# Patient Record
Sex: FEMALE | Race: WHITE | HISPANIC OR LATINO | Employment: FULL TIME | URBAN - METROPOLITAN AREA
[De-identification: names, ages, dates, MRNs, and addresses within clinical notes are randomized per-mention and may not be internally consistent; named-entity substitution may affect disease eponyms.]

---

## 2023-03-21 ENCOUNTER — OFFICE VISIT (OUTPATIENT)
Dept: MEDICAL GROUP | Facility: LAB | Age: 38
End: 2023-03-21
Payer: COMMERCIAL

## 2023-03-21 VITALS
BODY MASS INDEX: 39.93 KG/M2 | RESPIRATION RATE: 16 BRPM | DIASTOLIC BLOOD PRESSURE: 90 MMHG | WEIGHT: 217 LBS | HEART RATE: 76 BPM | HEIGHT: 62 IN | TEMPERATURE: 97.3 F | OXYGEN SATURATION: 97 % | SYSTOLIC BLOOD PRESSURE: 164 MMHG

## 2023-03-21 DIAGNOSIS — Z79.4 OTHER SPECIFIED DIABETES MELLITUS WITH OTHER SPECIFIED COMPLICATION, WITH LONG-TERM CURRENT USE OF INSULIN (HCC): ICD-10-CM

## 2023-03-21 DIAGNOSIS — K59.09 OTHER CONSTIPATION: ICD-10-CM

## 2023-03-21 DIAGNOSIS — E13.69 OTHER SPECIFIED DIABETES MELLITUS WITH OTHER SPECIFIED COMPLICATION, WITH LONG-TERM CURRENT USE OF INSULIN (HCC): ICD-10-CM

## 2023-03-21 DIAGNOSIS — E89.0 S/P THYROIDECTOMY: ICD-10-CM

## 2023-03-21 DIAGNOSIS — I15.9 SECONDARY HYPERTENSION: ICD-10-CM

## 2023-03-21 DIAGNOSIS — Z76.89 ENCOUNTER TO ESTABLISH CARE WITH NEW DOCTOR: ICD-10-CM

## 2023-03-21 DIAGNOSIS — R10.84 GENERALIZED ABDOMINAL PAIN: ICD-10-CM

## 2023-03-21 PROBLEM — I10 HYPERTENSIVE DISORDER: Status: ACTIVE | Noted: 2021-04-26

## 2023-03-21 PROBLEM — Z90.89 S/P THYROIDECTOMY: Status: ACTIVE | Noted: 2023-03-21

## 2023-03-21 PROBLEM — E11.9 DIABETES (HCC): Status: ACTIVE | Noted: 2021-04-26

## 2023-03-21 PROBLEM — Z98.890 S/P THYROIDECTOMY: Status: ACTIVE | Noted: 2023-03-21

## 2023-03-21 LAB
HBA1C MFR BLD: 8.2 % (ref ?–5.8)
POCT INT CON NEG: NEGATIVE
POCT INT CON POS: POSITIVE

## 2023-03-21 PROCEDURE — 83036 HEMOGLOBIN GLYCOSYLATED A1C: CPT | Performed by: FAMILY MEDICINE

## 2023-03-21 PROCEDURE — 99204 OFFICE O/P NEW MOD 45 MIN: CPT | Performed by: FAMILY MEDICINE

## 2023-03-21 RX ORDER — VALSARTAN AND HYDROCHLOROTHIAZIDE 160; 25 MG/1; MG/1
1 TABLET ORAL DAILY
COMMUNITY
End: 2023-03-21

## 2023-03-21 RX ORDER — DOCUSATE SODIUM 100 MG/1
100 CAPSULE, LIQUID FILLED ORAL 2 TIMES DAILY
Qty: 60 CAPSULE | Refills: 6 | Status: SHIPPED | OUTPATIENT
Start: 2023-03-21 | End: 2023-09-11

## 2023-03-21 RX ORDER — LOSARTAN POTASSIUM 50 MG/1
50 TABLET ORAL DAILY
Qty: 90 TABLET | Refills: 3 | Status: SHIPPED | OUTPATIENT
Start: 2023-03-21 | End: 2023-04-25

## 2023-03-21 RX ORDER — INSULIN LISPRO 100 [IU]/ML
INJECTION, SOLUTION INTRAVENOUS; SUBCUTANEOUS
COMMUNITY
End: 2023-03-27

## 2023-03-21 RX ORDER — INSULIN LISPRO 100 [IU]/ML
30-60 INJECTION, SOLUTION INTRAVENOUS; SUBCUTANEOUS
COMMUNITY

## 2023-03-22 NOTE — PROGRESS NOTES
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  Discussed the following today:    1. Other specified diabetes mellitus   Chronic ongoing problem, diagnosed in her 20s.  Patient said she was told she is type 1 insulin-dependent, taking large amount of insulin including long acting intermediate acting insulin 50 units twice a day and she takes up to 30 units of the regular insulin at the time before lunch sometimes.  Denies hypoglycemia episodes, used to have an endocrinologist to follow-up with in [place].  No records available yet.  A1c rechecked at the office today 8.2    2. Encounter to establish care with new doctor  As part of establishing care visit, reviewed patient past medical problems, past surgical surgical history, family/social history and medications today, patient works in [employer], lives with her partner.  Moved recently from [place]    3. Other constipation  Ongoing problem for the past few months, reports abdominal discomfort especially on the left flank area and the left side of her abdomen.  Patient has been trying to drink large amount of water and increase fiber in diet, sometimes she feels that her hemorrhoids are acting up because of the constipation    4. S/P thyroidectomy  Last year had a thyroidectomy in [place] related to thyroid mass, she was told it was benign.  Partial thyroidectomy was done.  Not on thyroid medications at this time.  No labs available or records from previous providers    5. Secondary hypertension  History of hypertension, blood pressure at the office today 164/90, patient used to be on valsartan hydrochlorothiazide combination medication stop the medication for the past 2 weeks because she did not have available medication.  And  requesting to change medication because hydrochlorothiazide causes her a lot of dry mouth.  Denies headache today or chest pain or shortness of breath    6. Generalized abdominal pain  Generalized nonspecific abdominal pain with some radiation on the left flank radiating to the lower abdominal area, this has been going on for more than 3 months now.  Reports constipation as described above.  Denies nausea vomiting or diarrhea denies fever.  Patient Active Problem List    Diagnosis Date Noted    Encounter to establish care with new doctor 03/21/2023    Other constipation 03/21/2023    S/P thyroidectomy 03/21/2023    Diabetes (HCC) 04/26/2021    Hypertensive disorder 04/26/2021       Current Outpatient Medications   Medication Sig Dispense Refill    insulin lispro 100 UNIT/ML INJ Inject  under the skin.      insulin lispro (HUMALOG KWIKPEN) 100 UNIT/ML SC SOPN injection PEN Inject  under the skin 3 times a day before meals.      docusate sodium (COLACE) 100 MG Cap Take 1 Capsule by mouth 2 times a day. 60 Capsule 6    losartan (COZAAR) 50 MG Tab Take 1 Tablet by mouth every day. 90 Tablet 3     No current facility-administered medications for this visit.         Allergies as of 03/21/2023 - never reviewed   Allergen Reaction Noted    Penicillins  03/21/2023        ROS: Denies any chest pain, Shortness of breath, Changes bowel or bladder, Lower extremity edema.    Physical Exam:  Gen.: Well-developed, well-nourished, no apparent distress,pleasant and cooperative with the examination  Skin:  Warm and dry with good turgor. No rashes or suspicious lesions in visible areas  Eye: PERRLA, conjunctiva and sclera clear, lids normal  HEENT: Normocephalic/atraumatic, sinuses nontender with palpation, TMs clear, nares patent with pink mucosa and clear rhinorrhea, lips without lesions, oropharynx clear.  Neck: Trachea midline,no masses or adenopathy  Thyroid: normal consistency and size. No masses or nodules. Not tender with  palpation.  Cor: Regular rate and rhythm without murmur, gallop or rub.  Lungs: Respirations unlabored.Clear to auscultation with equal breath sounds bilaterally. No wheezes, rhonchi.  Abdomen: Soft nontender without hepatosplenomegaly or masses appreciated, normoactive bowel sounds. No hernias.  Extremities: No cyanosis, clubbing or edema, Symmetrical without deformities or malformations. Pulses 2+ and symmetrical both upper and lower extremities  Lymphatic: No abnormal adenopathy of the neck groin or axillae.  Psych: Alert and oriented x 3.Normal affect, judgement,insight and memory.    Monofilament testing with a 10 gram force: sensation intact: intact bilaterally  Visual Inspection: Feet without maceration, ulcers, fissures.  Pedal pulses: intact bilaterally     Assessment and Plan.   37 y.o. female here to establish care    1. Other specified diabetes mellitus with other specified complication, with long-term current use of insulin (HCC)  Chronic ongoing uncontrolled.  A1c 8.2.  Discussed with the patient possibility of starting her on GLP-1 agonist, continue insulin for now referred to see endocrinology, patient to do labs before further assessment  - POCT  A1C  - Referral to Endocrinology  - Lipid Profile; Future  - Comp Metabolic Panel; Future  - URINALYSIS,CULTURE IF INDICATED; Future  - Microalbumin Creat Ratio Urine - Clinic Collect    2. Encounter to establish care with new doctor  Reviewed medical history as above, due for Pap, advised to schedule an appointment for cervical cancer screening, Tdap in the past 5 years as per patient history no records available    3. Other constipation  Chronic ongoing problem, discussed importance of hydration increase fiber in diet, will start patient on stool softener patient to take it regularly if not improved will consider GI consultation  - docusate sodium (COLACE) 100 MG Cap; Take 1 Capsule by mouth 2 times a day.  Dispense: 60 Capsule; Refill: 6  - DX-ABDOMEN  COMPLETE WITH AP OR PA CXR; Future    4. S/P thyroidectomy  Chronic, recheck thyroid function follow-up with endocrinology  - Referral to Endocrinology  - TSH; Future  - FREE THYROXINE; Future  - TRIIDOTHYRONINE; Future    5. Secondary hypertension  Chronic, not controlled.  Restart patient on losartan, follow-up in 2 weeks to recheck blood pressure.  Asymptomatic  - losartan (COZAAR) 50 MG Tab; Take 1 Tablet by mouth every day.  Dispense: 90 Tablet; Refill: 3  - CBC WITH DIFFERENTIAL; Future    6. Generalized abdominal pain  Chronic ongoing, possibly related to constipation, will rule out other causes, advised to do an ultrasound for further assessment.  No red flags at this time, benign clinical exam.  Advised if any increase on her pain or worsening of symptoms to report to emergency room.  - US-ABDOMEN COMPLETE SURVEY; Future  Please note that this dictation was created using voice recognition software. I have worked with consultants from the vendor as well as technical experts from [employer] to optimize the interface. I have made every reasonable attempt to correct obvious errors, but I expect that there are errors of grammar and possibly content that I did not discover before finalizing the note.

## 2023-03-27 ENCOUNTER — OFFICE VISIT (OUTPATIENT)
Dept: ENDOCRINOLOGY | Facility: MEDICAL CENTER | Age: 38
End: 2023-03-27
Payer: COMMERCIAL

## 2023-03-27 ENCOUNTER — APPOINTMENT (OUTPATIENT)
Dept: ENDOCRINOLOGY | Facility: MEDICAL CENTER | Age: 38
End: 2023-03-27
Payer: COMMERCIAL

## 2023-03-27 VITALS
HEART RATE: 80 BPM | BODY MASS INDEX: 41.22 KG/M2 | WEIGHT: 224 LBS | HEIGHT: 62 IN | SYSTOLIC BLOOD PRESSURE: 164 MMHG | OXYGEN SATURATION: 98 % | DIASTOLIC BLOOD PRESSURE: 90 MMHG

## 2023-03-27 DIAGNOSIS — I10 HYPERTENSION, UNSPECIFIED TYPE: ICD-10-CM

## 2023-03-27 DIAGNOSIS — B37.9 YEAST INFECTION: ICD-10-CM

## 2023-03-27 DIAGNOSIS — Z13.21 ENCOUNTER FOR VITAMIN DEFICIENCY SCREENING: ICD-10-CM

## 2023-03-27 DIAGNOSIS — E10.9 TYPE 1 DIABETES MELLITUS WITHOUT COMPLICATION (HCC): ICD-10-CM

## 2023-03-27 DIAGNOSIS — E89.0 S/P PARTIAL THYROIDECTOMY: ICD-10-CM

## 2023-03-27 PROCEDURE — 99205 OFFICE O/P NEW HI 60 MIN: CPT

## 2023-03-27 PROCEDURE — 99213 OFFICE O/P EST LOW 20 MIN: CPT

## 2023-03-27 RX ORDER — METFORMIN HYDROCHLORIDE 500 MG/1
500 TABLET, EXTENDED RELEASE ORAL 2 TIMES DAILY
Qty: 180 TABLET | Refills: 4 | Status: SHIPPED | OUTPATIENT
Start: 2023-03-27 | End: 2023-07-14

## 2023-03-27 RX ORDER — FLUCONAZOLE 150 MG/1
150 TABLET ORAL DAILY
Qty: 3 TABLET | Refills: 0 | Status: SHIPPED | OUTPATIENT
Start: 2023-03-27 | End: 2023-03-30

## 2023-03-27 RX ORDER — SEMAGLUTIDE 0.68 MG/ML
0.5 INJECTION, SOLUTION SUBCUTANEOUS
Qty: 9 ML | Refills: 3 | Status: SHIPPED | OUTPATIENT
Start: 2023-03-27 | End: 2023-09-28

## 2023-03-27 RX ORDER — INSULIN HUMAN 100 [IU]/ML
60 INJECTION, SUSPENSION SUBCUTANEOUS
COMMUNITY
End: 2023-09-11 | Stop reason: SDUPTHER

## 2023-03-27 NOTE — PROGRESS NOTES
RN-CDE Note    Subjective:   Endocrinology Clinic Progress Note  PCP: Bijan Chi M.D.    HPI:  Rita Norris is a 37 y.o. old patient who is seen today by the Diabetes Nurse Specialist for review of Diabetes.  Unsure if she is Type 1 , 1.5, or Type 2.  Recent changes in health: Gaining weight and having yeast infection.  DM:   Last A1c:   Lab Results   Component Value Date/Time    HBA1C 8.2 (A) 03/21/2023 05:02 PM        A1C GOAL: < 7    Diabetes Medications:   70/30 60 units BID  Humalog 30 units at lunch and Humalog 60 units at dinner      Exercise: Walking at work  Diet: Breakfast is breakfast sandwich and coffee.  1 pm is lunch is tacos, sandwich, or salad.  Dinner is protein, pasta, and juicing vegetables.  Patient's body mass index is unknown because there is no height or weight on file. Exercise and nutrition counseling were performed at this visit.    Glucose monitoring frequency: Dexcom .  She will share with us so that we can download her CGM    Hypoglycemic episodes: yes - During the night if takes too much insulin.  Last Retinal Exam:  Needs  Daily Foot Exam: Yes   Foot Exam:  Monofilament: done  Monofilament testing with a 10 gram force: sensation intact: intact bilaterally  Visual Inspection: Feet without maceration, ulcers, fissures.  Pedal pulses: intact bilaterally   No results found for: MICROALBCALC, MALBCRT, MALBEXCR, [id number], MICROALBUR, MICRALB, UMICROALBUM, MICROALBTIM     ACR Albumin/Creatinine Ratio goal <30     HTN:   Blood pressure goal <130/<80 .   Currently Rx ACE/ARB: Yes     Dyslipidemia:    No results found for: CHOLSTRLTOT, LDL, HDL, TRIGLYCERIDE      Currently Rx Statin: No     She  reports that she has been smoking cigarettes. She has been smoking an average of .5 packs per day. She has never used smokeless tobacco.      Plan:     Discussed and educated on:   - All medications, side effects and compliance (discussed carefully)  - Annual eye examinations at Ophthalmology  -  Home glucose monitoring emphasized  - Weight control and daily exercise    Recommended medication changes: She will get her lab work done.  Start on Ozempic and back on Meformin  mg BID.    Ozempic .25-.5 mg pen x1 sampled ( [id number] expir. 7/31).

## 2023-03-27 NOTE — PROGRESS NOTES
Chief Complaint:  Consult requested by Bijan Chi M.D. for initial evaluation of Type 1 Diabetes Mellitus    HPI:   Rita Norris is a 37 y.o. female with Type 1 Diabetes Mellitus diagnosed at the age of 22.  She denies hospitalizations for DKA in the past. She recently moved from [place] about a month ago.     Diabetes Type 1    Glycohemoglobin A1c on 3/21/23 was 8.2    Current Medications  70/30 60 units in am and before dinner  Humalog 30 units at lunch 30-60 units at dinner    She was previously on metformin and glipizide pre pregnancy 9 years ago, during pregnancy she was placed on insulin and stayed post pregnancy.     She monitors blood glucose via dexcom. See Download  Blood glucose values range:Fastin-120            She denies hypoglycemic episodes   She  denies hypoglycemic unawareness.   She denies episodes of severe hypoglycemia requiring third party assistance.    She  is not wearing a medical alert bracelet or necklace.    She does not a glucagon emergency kit.    She reports attending diabetes education classes.  Diet: eating better since recent move adopted mediterranean diet, prior eating more proteins and limiting carbs.  Activity: minimal    Diabetes Complications   She  denies history of retinopathy.    She denies laser eye surgery.   Last eye exam: a year ago    She denies history of peripheral sensory neuropathy.    She reports numbness in fingertips- occasionally  She denies history of foot sores.     She denies history of kidney damage.    She is on ACE inhibitor or ARB.  Currently taking losartan 50 mg daily  BP: 164/90    She denies history of coronary artery disease.    She  denies history of stroke and denies TIA.    She denies history of PAD.  She denies history of hyperlipidemia.    2. Hypertension  Currently taking losartan 50mg daily  She just started taking the losartarn today being the first dose. She states the blood pressure has been higher before.  BP: 164/90    3.  S/P Partial thyroidectomy  Partial right thyroidectomy last year in [place] related to thyroid mass which she was told was benign. Currently not on medication.   Patient states she feels like something is stuck in her throat.   Denies: SOB, anterior neck pain.   Reports: weight gain, mind fogginess, anxiety  Denies: fatigue, tremors, palpitations  No current thyroid labs available    4. Screening for vitamin D deficiency    5. Yeast infection  Started 2 days ago  Reports: itching, white discharge, raw in perineal area and painful    ROS:     CONS:     No fever, no chills, no weight loss, no fatigue   EYES:      No diplopia, no blurry vision, no redness of eyes, no swelling of eyelids   ENT:    No hearing loss, No ear pain, No sore throat, no dysphagia, no neck swelling   CV:     No chest pain, no palpitations, no claudication, no orthopnea, no PND   PULM:    No SOB, no cough, no hemoptysis, no wheezing    GI:   No nausea, no vomiting, no diarrhea, no constipation, no bloody stools   :  Passing urine well, no dysuria, no hematuria   ENDO:   No polyuria, no polydipsia, no heat intolerance, no cold intolerance   NEURO: No headaches, no dizziness, no convulsions, no tremors   MUSC:  No joint swellings, no arthralgias, no myalgias, no weakness   SKIN:   No rash, no ulcers, no dry skin   PSYCH:   No depression, no anxiety, no difficulty sleeping       Past Medical History:  Patient Active Problem List    Diagnosis Date Noted    Encounter to establish care with new doctor 03/21/2023    Other constipation 03/21/2023    S/P thyroidectomy 03/21/2023    Diabetes (HCC) 04/26/2021    Hypertensive disorder 04/26/2021       Past Surgical History:  Past Surgical History:   Procedure Laterality Date    PRIMARY C SECTION          Allergies:  Penicillins     Current Medications:    Current Outpatient Medications:     Insulin NPH Isophane & Regular (HUMULIN 70/30 KWIKPEN) (70-30) 100 UNIT/ML Suspension Pen-injector, Inject 60 Units  under the skin 2 times daily, before breakfast and dinner., Disp: , Rfl:     Multiple Vitamins-Minerals (QC WOMENS DAILY MULTIVITAMIN PO), Take  by mouth., Disp: , Rfl:     fluconazole (DIFLUCAN) 150 MG tablet, Take 1 Tablet by mouth every day for 3 days., Disp: 3 Tablet, Rfl: 0    Semaglutide,0.25 or 0.5MG/DOS, (OZEMPIC, 0.25 OR 0.5 MG/DOSE,) 2 MG/3ML Solution Pen-injector, Inject 0.5 mg under the skin every 7 days., Disp: 9 mL, Rfl: 3    metFORMIN ER (GLUCOPHAGE XR) 500 MG TABLET SR 24 HR, Take 1 Tablet by mouth 2 times a day., Disp: 180 Tablet, Rfl: 4    insulin lispro 100 UNIT/ML SC SOPN injection PEN, Inject 30-60 Units under the skin 3 times a day before meals., Disp: , Rfl:     docusate sodium (COLACE) 100 MG Cap, Take 1 Capsule by mouth 2 times a day., Disp: 60 Capsule, Rfl: 6    losartan (COZAAR) 50 MG Tab, Take 1 Tablet by mouth every day., Disp: 90 Tablet, Rfl: 3    Social History:  Social History     Socioeconomic History    Marital status: Single     Spouse name: Not on file    Number of children: Not on file    Years of education: Not on file    Highest education level: Not on file   Occupational History     Comment: manifacturing   Tobacco Use    Smoking status: Some Days     Packs/day: 0.50     Types: Cigarettes    Smokeless tobacco: Never   Vaping Use    Vaping Use: Not on file   Substance and Sexual Activity    Alcohol use: Yes     Alcohol/week: 0.6 oz     Types: 1 Shots of liquor per week    Drug use: Not Currently     Types: Marijuana    Sexual activity: Yes     Partners: Male   Other Topics Concern    Not on file   Social History Narrative    Not on file     Social Determinants of Health     Financial Resource Strain: Not on file   Food Insecurity: Not on file   Transportation Needs: Not on file   Physical Activity: Not on file   Stress: Not on file   Social Connections: Not on file   Intimate Partner Violence: Not on file   Housing Stability: Not on file        Family History:   Family History  "  Problem Relation Age of Onset    Psychiatric Illness Mother         depression    Cancer Mother         thyroid ca    Hypertension Father     Alcohol abuse Father     Diabetes Paternal Grandmother     Heart Disease Paternal Grandfather          PHYSICAL EXAM:   Vital signs: BP (!) 164/90   Pulse 80   Ht 1.575 m (5' 2\")   Wt 102 kg (224 lb)   SpO2 98%   BMI 40.97 kg/m²   GENERAL: Well-developed, well-nourished  in no apparent distress.   EYE: No ocular and eyelid asymmetry, Anicteric sclerae,  PERRL, No exophthalmos or lidlag  HENT: Hearing grossly intact, Normocephalic, atraumatic. Pink, moist mucous membranes, No exudate  NECK: Supple. Trachea midline.   CARDIOVASCULAR: Regular rate and rhythm. No murmurs, rubs, or gallops.   LUNGS: Clear to auscultation bilaterally   ABDOMEN: Soft, nontender with positive bowel sounds.   EXTREMITIES: No clubbing, cyanosis, or edema.   NEUROLOGICAL: Cranial nerves II-XII are grossly intact   Symmetric reflexes at the patella no proximal muscle weakness, No visible tremor with both outstretched hands  LYMPH: No cervical, supraclavicular,  adenopathy palpated.   SKIN: No rashes, lesions. Turgor is normal.  FOOT: Normal sensation to monofilament testing, normal pulses, no ulcers.  Normal Vibration quantitative sensation test.    Labs:  Lab Results   Component Value Date/Time    HBA1C 8.2 (A) 03/21/2023 1702       No results found for: WBC, RBC, HEMOGLOBIN, MCV, MCH, MCHC, RDW, MPV    No results found for: SODIUM, POTASSIUM, CHLORIDE, CO2, ANION, GLUCOSE, BUN, CREATININE, CALCIUM, ASTSGOT, ALTSGPT, TBILIRUBIN, ALBUMIN, TOTPROTEIN, GLOBULIN, AGRATIO    No results found for: CHOLSTRLTOT, TRIGLYCERIDE, HDL, LDL, CHOLHDLRAT, NONHDL    No results found for: MICROALBCALC, MALBCRT, MALBEXCR, [id number], MICROALBUR, MICRALB, UMICROALBUM, MICROALBTIM     No results found for: TSHULTRASEN  No results found for: FREEDIR  No results found for: FREET3  No results found for: " "THYSTIMIG        ASSESSMENT/PLAN:     1. Type 1 diabetes mellitus without complication (HCC)  Unstable  Medications:   Ozempic 0.25mg every 7 days - Start  Metformin  mg BID - Start  70/30 - 60 units 2 times daily before meals - Continue  Humalog - 30 units before lunch - Continue  Sample of Ozempic given to patient. Patient understands how to use pen and demonstrated back. Discussed side effects with patient.   Patient understands to adjust insulin according to blood sugars prior to meals.  Continue with diet plan  Recommend exercise 30 mins daily  Recommend check her feet daily  - Comp Metabolic Panel; Future  - CBC WITH DIFFERENTIAL; Future  - C-PEPTIDE; Future  - MELITON-65; Future  - Lipid Profile; Future  - MICROALBUMIN CREAT RATIO URINE; Future  - IA-2 AUTOANTIBODIES  - Semaglutide,0.25 or 0.5MG/DOS, (OZEMPIC, 0.25 OR 0.5 MG/DOSE,) 2 MG/3ML Solution Pen-injector; Inject 0.5 mg under the skin every 7 days.  Dispense: 9 mL; Refill: 3  - metFORMIN ER (GLUCOPHAGE XR) 500 MG TABLET SR 24 HR; Take 1 Tablet by mouth 2 times a day.  Dispense: 180 Tablet; Refill: 4    2. S/P partial thyroidectomy  Unstable  I will get thyroid panel for further evaluation and treatment  I will get US of thyroid as patient is c/o feelings of \"something stuck in her throat\"  - FREE THYROXINE; Future  - TSH; Future  - US-THYROID; Future    3. Hypertension, unspecified type  Unstable  Patient started losartan today  Continue current regimen    4. Encounter for vitamin deficiency screening  Discussed symptoms of vitamin D deficiency including, fatigue, mind fogginess, and anxiety  I will get Vitamin D levels for further evaluation  - VITAMIN D,25 HYDROXY (DEFICIENCY); Future    5. Yeast infection  Unstable  Recommend Diflucan 150 mg daily x 3 days  Recommend patient to stay hydrated  - fluconazole (DIFLUCAN) 150 MG tablet; Take 1 Tablet by mouth every day for 3 days.  Dispense: 3 Tablet; Refill: 0     Return in about 4 weeks (around " 4/24/2023). Patient will have blood work completed 1 week prior to next apt in 4 weeks.   65 mins was spent on this visit discussing treatment plan, demonstration of medication pen, discussing diet, and answering questions.     This patient during there office visit was started on new medication.  Side effects of new medications were discussed with the patient today in the office. The patient was supplied paperwork on this new medication.    Thank you kindly for allowing me to participate in the diabetes care plan for this patient.    Elio Fonseca, ANAIS   03/27/23    CC:   Bijan Chi M.D.

## 2023-03-27 NOTE — PROGRESS NOTES
Chief Complaint:  Consult requested by Bijan Chi M.D. for initial evaluation of Type 1 Diabetes Mellitus    HPI:   Rita Norris is a 37 y.o. female with Type 1 Diabetes Mellitus diagnosed at the age of 22.  She {Actions; denies-reports:025817} hospitalizations for DKA in the past.    Diabetes Type 1    Glycohemoglobin A1c on 3/21/23 was 8.2    Current Medications  Humalog TID before meal times      She monitors blood glucose  {NUMBERS 1-12:24399} times per {Time; day/week/month:34640}. Blood glucose values range:{GLUCOSE TESTIN}           She {DENIES:96477} hypoglycemic episodes occurring {NUMBERS 1-10:81618} times per {Time; day/week/month:17608} and are {MILD:56979}    She  {Actions; denies-reports:042830} hypoglycemic unawareness.   She {Actions; denies-reports:649796} episodes of severe hypoglycemia requiring third party assistance.    She  {IS/IS NOT:9024} wearing a medical alert bracelet or necklace.    She {DOES/DOES NOT:11791} a glucagon emergency kit.    She {Actions; denies-reports:363704} attending diabetes education classes.  Diet: {Desc; diets:16647}.    Diabetes Complications   She  {Actions; denies-reports:050438} history of retinopathy.    She {Actions; denies-reports:645640} laser eye surgery.   Last eye exam: {Time; month:86628}  {YEAR:69240}.    She {Actions; denies-reports:894913} history of peripheral sensory neuropathy.    She {Actions; denies-reports:977505} {Numbness:20644} in {FOOT/FEET:45023}.    She {Actions; denies-reports:349837} history of foot sores.     She {Actions; denies-reports:484622} history of kidney damage.    She is on ACE inhibitor or ARB.   Currently taking losartan 50 mg daily    She {Actions; denies-reports:242765} history of coronary artery disease.    She  {Actions; denies-reports:178869} history of stroke and {Actions; denies-reports:141670} TIA.  She {Actions; denies-reports:299761} history of PAD.  She {Actions; denies-reports:421805} history of  hyperlipidemia.    2. Hypertension  Currently taking losartan 50mg daily  BP:     3. S/P Partial thyroidectomy  Partial *** Thyroidectomy last year in [place] related to thyroid mass which she was told was benign. Currently not on medication.   Denies/Reports: weight gain, fatigue, mind fogginess, anxiety, tremors, palpitations  No current thyroid labs available    4. Screening for Vitamin D deficiency        ROS:     CONS:     No fever, no chills, no weight loss, no fatigue   EYES:      No diplopia, no blurry vision, no redness of eyes, no swelling of eyelids   ENT:    No hearing loss, No ear pain, No sore throat, no dysphagia, no neck swelling   CV:     No chest pain, no palpitations, no claudication, no orthopnea, no PND   PULM:    No SOB, no cough, no hemoptysis, no wheezing    GI:   No nausea, no vomiting, no diarrhea, no constipation, no bloody stools   :  Passing urine well, no dysuria, no hematuria   ENDO:   No polyuria, no polydipsia, no heat intolerance, no cold intolerance   NEURO: No headaches, no dizziness, no convulsions, no tremors   MUSC:  No joint swellings, no arthralgias, no myalgias, no weakness   SKIN:   No rash, no ulcers, no dry skin   PSYCH:   No depression, no anxiety, no difficulty sleeping       Past Medical History:  Patient Active Problem List    Diagnosis Date Noted    Encounter to establish care with new doctor 03/21/2023    Other constipation 03/21/2023    S/P thyroidectomy 03/21/2023    Diabetes (HCC) 04/26/2021    Hypertensive disorder 04/26/2021       Past Surgical History:  Past Surgical History:   Procedure Laterality Date    PRIMARY C SECTION          Allergies:  Penicillins     Current Medications:    Current Outpatient Medications:     insulin lispro 100 UNIT/ML INJ, Inject  under the skin., Disp: , Rfl:     insulin lispro (HUMALOG KWIKPEN) 100 UNIT/ML SC SOPN injection PEN, Inject  under the skin 3 times a day before meals., Disp: , Rfl:     docusate sodium (COLACE) 100 MG  Cap, Take 1 Capsule by mouth 2 times a day., Disp: 60 Capsule, Rfl: 6    losartan (COZAAR) 50 MG Tab, Take 1 Tablet by mouth every day., Disp: 90 Tablet, Rfl: 3    Social History:  Social History     Socioeconomic History    Marital status: Single     Spouse name: Not on file    Number of children: Not on file    Years of education: Not on file    Highest education level: Not on file   Occupational History     Comment: manifacturing   Tobacco Use    Smoking status: Some Days     Packs/day: 0.50     Types: Cigarettes    Smokeless tobacco: Never   Vaping Use    Vaping Use: Not on file   Substance and Sexual Activity    Alcohol use: Yes     Alcohol/week: 0.6 oz     Types: 1 Shots of liquor per week    Drug use: Not Currently     Types: Marijuana    Sexual activity: Yes     Partners: Male   Other Topics Concern    Not on file   Social History Narrative    Not on file     Social Determinants of Health     Financial Resource Strain: Not on file   Food Insecurity: Not on file   Transportation Needs: Not on file   Physical Activity: Not on file   Stress: Not on file   Social Connections: Not on file   Intimate Partner Violence: Not on file   Housing Stability: Not on file        Family History:   Family History   Problem Relation Age of Onset    Psychiatric Illness Mother         depression    Cancer Mother         thyroid ca    Hypertension Father     Alcohol abuse Father     Diabetes Paternal Grandmother     Heart Disease Paternal Grandfather          PHYSICAL EXAM:   Vital signs: There were no vitals taken for this visit.  GENERAL: Well-developed, well-nourished  in no apparent distress.   EYE: No ocular and eyelid asymmetry, Anicteric sclerae,  PERRL, No exophthalmos or lidlag  HENT: Hearing grossly intact, Normocephalic, atraumatic. Pink, moist mucous membranes, No exudate  NECK: Supple. Trachea midline. {thyroid:5702}  CARDIOVASCULAR: Regular rate and rhythm. No murmurs, rubs, or gallops.   LUNGS: Clear to  auscultation bilaterally   ABDOMEN: Soft, nontender with positive bowel sounds.   EXTREMITIES: No clubbing, cyanosis, or edema.   NEUROLOGICAL: Cranial nerves II-XII are grossly intact   Symmetric reflexes at the patella no proximal muscle weakness, No visible tremor with both outstretched hands  LYMPH: No cervical, supraclavicular,  adenopathy palpated.   SKIN: No rashes, lesions. Turgor is normal.  FOOT: Normal sensation to monofilament testing, normal pulses, no ulcers.  Normal Vibration quantitative sensation test.    Labs:  Lab Results   Component Value Date/Time    HBA1C 8.2 (A) 03/21/2023 1702       No results found for: WBC, RBC, HEMOGLOBIN, MCV, MCH, MCHC, RDW, MPV    No results found for: SODIUM, POTASSIUM, CHLORIDE, CO2, ANION, GLUCOSE, BUN, CREATININE, CALCIUM, ASTSGOT, ALTSGPT, TBILIRUBIN, ALBUMIN, TOTPROTEIN, GLOBULIN, AGRATIO    No results found for: CHOLSTRLTOT, TRIGLYCERIDE, HDL, LDL, CHOLHDLRAT, NONHDL    No results found for: MICROALBCALC, MALBCRT, MALBEXCR, [id number], MICROALBUR, MICRALB, UMICROALBUM, MICROALBTIM     No results found for: TSHULTRASEN  No results found for: FREEDIR  No results found for: FREET3  No results found for: THYSTIMIG        ASSESSMENT/PLAN:     There are no diagnoses linked to this encounter.    No follow-ups on file.       This patient during there office visit was started on new medication.  Side effects of new medications were discussed with the patient today in the office. The patient was supplied paperwork on this new medication.    Thank you kindly for allowing me to participate in the diabetes care plan for this patient.    Nishant Corley MD, [facility], [facility]  03/27/23    CC:   Bijan Chi M.D.

## 2023-04-10 ENCOUNTER — HOSPITAL ENCOUNTER (OUTPATIENT)
Dept: LAB | Facility: MEDICAL CENTER | Age: 38
End: 2023-04-10
Attending: FAMILY MEDICINE
Payer: COMMERCIAL

## 2023-04-10 ENCOUNTER — HOSPITAL ENCOUNTER (OUTPATIENT)
Dept: LAB | Facility: MEDICAL CENTER | Age: 38
End: 2023-04-10
Payer: COMMERCIAL

## 2023-04-10 DIAGNOSIS — E13.69 OTHER SPECIFIED DIABETES MELLITUS WITH OTHER SPECIFIED COMPLICATION, WITH LONG-TERM CURRENT USE OF INSULIN (HCC): ICD-10-CM

## 2023-04-10 DIAGNOSIS — Z79.4 OTHER SPECIFIED DIABETES MELLITUS WITH OTHER SPECIFIED COMPLICATION, WITH LONG-TERM CURRENT USE OF INSULIN (HCC): ICD-10-CM

## 2023-04-10 DIAGNOSIS — E89.0 S/P THYROIDECTOMY: ICD-10-CM

## 2023-04-10 DIAGNOSIS — E10.9 TYPE 1 DIABETES MELLITUS WITHOUT COMPLICATION (HCC): ICD-10-CM

## 2023-04-10 DIAGNOSIS — E89.0 S/P PARTIAL THYROIDECTOMY: ICD-10-CM

## 2023-04-10 LAB
ALBUMIN SERPL BCP-MCNC: 4.3 G/DL (ref 3.2–4.9)
ALBUMIN/GLOB SERPL: 1.3 G/DL
ALP SERPL-CCNC: 113 U/L (ref 30–99)
ALT SERPL-CCNC: 24 U/L (ref 2–50)
AMORPH CRY #/AREA URNS HPF: PRESENT /HPF
ANION GAP SERPL CALC-SCNC: 14 MMOL/L (ref 7–16)
APPEARANCE UR: ABNORMAL
AST SERPL-CCNC: 15 U/L (ref 12–45)
BACTERIA #/AREA URNS HPF: ABNORMAL /HPF
BILIRUB SERPL-MCNC: 0.2 MG/DL (ref 0.1–1.5)
BILIRUB UR QL STRIP.AUTO: NEGATIVE
BUN SERPL-MCNC: 12 MG/DL (ref 8–22)
CALCIUM ALBUM COR SERPL-MCNC: 9 MG/DL (ref 8.5–10.5)
CALCIUM SERPL-MCNC: 9.2 MG/DL (ref 8.5–10.5)
CAOX CRY #/AREA URNS HPF: ABNORMAL /HPF
CHLORIDE SERPL-SCNC: 104 MMOL/L (ref 96–112)
CHOLEST SERPL-MCNC: 193 MG/DL (ref 100–199)
CO2 SERPL-SCNC: 21 MMOL/L (ref 20–33)
COLOR UR: YELLOW
CREAT SERPL-MCNC: 0.57 MG/DL (ref 0.5–1.4)
EPI CELLS #/AREA URNS HPF: ABNORMAL /HPF
GFR SERPLBLD CREATININE-BSD FMLA CKD-EPI: 121 ML/MIN/1.73 M 2
GLOBULIN SER CALC-MCNC: 3.2 G/DL (ref 1.9–3.5)
GLUCOSE SERPL-MCNC: 108 MG/DL (ref 65–99)
GLUCOSE UR STRIP.AUTO-MCNC: NEGATIVE MG/DL
HDLC SERPL-MCNC: 38 MG/DL
KETONES UR STRIP.AUTO-MCNC: ABNORMAL MG/DL
LDLC SERPL CALC-MCNC: 106 MG/DL
LEUKOCYTE ESTERASE UR QL STRIP.AUTO: NEGATIVE
MICRO URNS: ABNORMAL
NITRITE UR QL STRIP.AUTO: NEGATIVE
PH UR STRIP.AUTO: 5.5 [PH] (ref 5–8)
POTASSIUM SERPL-SCNC: 4.4 MMOL/L (ref 3.6–5.5)
PROT SERPL-MCNC: 7.5 G/DL (ref 6–8.2)
PROT UR QL STRIP: 100 MG/DL
RBC # URNS HPF: ABNORMAL /HPF
RBC UR QL AUTO: ABNORMAL
SODIUM SERPL-SCNC: 139 MMOL/L (ref 135–145)
SP GR UR STRIP.AUTO: 1.03
T3 SERPL-MCNC: 109 NG/DL (ref 60–181)
T4 FREE SERPL-MCNC: 0.88 NG/DL (ref 0.93–1.7)
TRIGL SERPL-MCNC: 243 MG/DL (ref 0–149)
TSH SERPL DL<=0.005 MIU/L-ACNC: 3.65 UIU/ML (ref 0.38–5.33)
UROBILINOGEN UR STRIP.AUTO-MCNC: 0.2 MG/DL
WBC #/AREA URNS HPF: ABNORMAL /HPF

## 2023-04-10 PROCEDURE — 84480 ASSAY TRIIODOTHYRONINE (T3): CPT

## 2023-04-10 PROCEDURE — 85025 COMPLETE CBC W/AUTO DIFF WBC: CPT

## 2023-04-10 PROCEDURE — 81001 URINALYSIS AUTO W/SCOPE: CPT

## 2023-04-10 PROCEDURE — 36415 COLL VENOUS BLD VENIPUNCTURE: CPT

## 2023-04-10 PROCEDURE — 87077 CULTURE AEROBIC IDENTIFY: CPT

## 2023-04-10 PROCEDURE — 80053 COMPREHEN METABOLIC PANEL: CPT

## 2023-04-10 PROCEDURE — 80061 LIPID PANEL: CPT

## 2023-04-10 PROCEDURE — 87086 URINE CULTURE/COLONY COUNT: CPT

## 2023-04-10 PROCEDURE — 84439 ASSAY OF FREE THYROXINE: CPT

## 2023-04-10 PROCEDURE — 84443 ASSAY THYROID STIM HORMONE: CPT

## 2023-04-11 LAB
BASOPHILS # BLD AUTO: 0.9 % (ref 0–1.8)
BASOPHILS # BLD: 0.1 K/UL (ref 0–0.12)
EOSINOPHIL # BLD AUTO: 0.19 K/UL (ref 0–0.51)
EOSINOPHIL NFR BLD: 1.7 % (ref 0–6.9)
ERYTHROCYTE [DISTWIDTH] IN BLOOD BY AUTOMATED COUNT: 44.1 FL (ref 35.9–50)
HCT VFR BLD AUTO: 45.5 % (ref 37–47)
HGB BLD-MCNC: 14.5 G/DL (ref 12–16)
IMM GRANULOCYTES # BLD AUTO: 0.05 K/UL (ref 0–0.11)
IMM GRANULOCYTES NFR BLD AUTO: 0.5 % (ref 0–0.9)
LYMPHOCYTES # BLD AUTO: 2.91 K/UL (ref 1–4.8)
LYMPHOCYTES NFR BLD: 26.4 % (ref 22–41)
MCH RBC QN AUTO: 25.8 PG (ref 27–33)
MCHC RBC AUTO-ENTMCNC: 31.9 G/DL (ref 33.6–35)
MCV RBC AUTO: 80.8 FL (ref 81.4–97.8)
MONOCYTES # BLD AUTO: 0.52 K/UL (ref 0–0.85)
MONOCYTES NFR BLD AUTO: 4.7 % (ref 0–13.4)
NEUTROPHILS # BLD AUTO: 7.24 K/UL (ref 2–7.15)
NEUTROPHILS NFR BLD: 65.8 % (ref 44–72)
NRBC # BLD AUTO: 0 K/UL
NRBC BLD-RTO: 0 /100 WBC
PLATELET # BLD AUTO: 389 K/UL (ref 164–446)
PMV BLD AUTO: 10.7 FL (ref 9–12.9)
RBC # BLD AUTO: 5.63 M/UL (ref 4.2–5.4)
WBC # BLD AUTO: 11 K/UL (ref 4.8–10.8)

## 2023-04-12 LAB
BACTERIA UR CULT: ABNORMAL
BACTERIA UR CULT: ABNORMAL
SIGNIFICANT IND 70042: ABNORMAL
SITE SITE: ABNORMAL
SOURCE SOURCE: ABNORMAL

## 2023-04-12 RX ORDER — SULFAMETHOXAZOLE AND TRIMETHOPRIM 800; 160 MG/1; MG/1
1 TABLET ORAL 2 TIMES DAILY
Qty: 6 TABLET | Refills: 0 | Status: SHIPPED | OUTPATIENT
Start: 2023-04-12 | End: 2023-09-11

## 2023-04-17 ENCOUNTER — HOSPITAL ENCOUNTER (OUTPATIENT)
Dept: RADIOLOGY | Facility: MEDICAL CENTER | Age: 38
End: 2023-04-17
Payer: COMMERCIAL

## 2023-04-17 ENCOUNTER — HOSPITAL ENCOUNTER (OUTPATIENT)
Dept: RADIOLOGY | Facility: MEDICAL CENTER | Age: 38
End: 2023-04-17
Attending: FAMILY MEDICINE
Payer: COMMERCIAL

## 2023-04-17 DIAGNOSIS — R10.84 GENERALIZED ABDOMINAL PAIN: ICD-10-CM

## 2023-04-17 DIAGNOSIS — E89.0 S/P PARTIAL THYROIDECTOMY: ICD-10-CM

## 2023-04-17 DIAGNOSIS — K59.09 OTHER CONSTIPATION: ICD-10-CM

## 2023-04-17 PROCEDURE — 74022 RADEX COMPL AQT ABD SERIES: CPT

## 2023-04-17 PROCEDURE — 76536 US EXAM OF HEAD AND NECK: CPT

## 2023-04-17 PROCEDURE — 76700 US EXAM ABDOM COMPLETE: CPT

## 2023-04-19 RX ORDER — DOCUSATE SODIUM 100 MG/1
100 CAPSULE, LIQUID FILLED ORAL 2 TIMES DAILY
Qty: 60 CAPSULE | Refills: 3 | Status: SHIPPED | OUTPATIENT
Start: 2023-04-19

## 2023-04-25 ENCOUNTER — TELEPHONE (OUTPATIENT)
Dept: MEDICAL GROUP | Facility: LAB | Age: 38
End: 2023-04-25

## 2023-04-25 DIAGNOSIS — I15.9 SECONDARY HYPERTENSION: ICD-10-CM

## 2023-04-25 RX ORDER — VALSARTAN 160 MG/1
160 TABLET ORAL DAILY
Qty: 30 TABLET | Refills: 3 | Status: SHIPPED | OUTPATIENT
Start: 2023-04-25 | End: 2023-09-11

## 2023-04-25 NOTE — TELEPHONE ENCOUNTER
Rita called concerned that the new BP medication Losartan isn't helping with her blood pressure. She wants to know what should she do.  She has an appointment on 5/2/23 to followup, but sounds like she would like to go back to her previous medication of Valsartan.Please advise.

## 2023-06-22 ENCOUNTER — APPOINTMENT (OUTPATIENT)
Dept: MEDICAL GROUP | Facility: LAB | Age: 38
End: 2023-06-22
Payer: COMMERCIAL

## 2023-07-14 ENCOUNTER — OFFICE VISIT (OUTPATIENT)
Dept: ENDOCRINOLOGY | Facility: MEDICAL CENTER | Age: 38
End: 2023-07-14
Payer: COMMERCIAL

## 2023-07-14 VITALS
WEIGHT: 218.4 LBS | DIASTOLIC BLOOD PRESSURE: 96 MMHG | SYSTOLIC BLOOD PRESSURE: 146 MMHG | HEIGHT: 62 IN | OXYGEN SATURATION: 97 % | HEART RATE: 96 BPM | BODY MASS INDEX: 40.19 KG/M2

## 2023-07-14 DIAGNOSIS — E89.0 S/P PARTIAL THYROIDECTOMY: ICD-10-CM

## 2023-07-14 DIAGNOSIS — Z13.21 ENCOUNTER FOR VITAMIN DEFICIENCY SCREENING: ICD-10-CM

## 2023-07-14 DIAGNOSIS — E10.9 TYPE 1 DIABETES MELLITUS WITHOUT COMPLICATION (HCC): ICD-10-CM

## 2023-07-14 DIAGNOSIS — I10 HYPERTENSION, UNSPECIFIED TYPE: ICD-10-CM

## 2023-07-14 LAB
HBA1C MFR BLD: 8.1 % (ref ?–5.8)
POCT INT CON NEG: NEGATIVE
POCT INT CON POS: POSITIVE

## 2023-07-14 PROCEDURE — 83036 HEMOGLOBIN GLYCOSYLATED A1C: CPT

## 2023-07-14 PROCEDURE — 99213 OFFICE O/P EST LOW 20 MIN: CPT

## 2023-07-14 PROCEDURE — 3080F DIAST BP >= 90 MM HG: CPT

## 2023-07-14 PROCEDURE — 3077F SYST BP >= 140 MM HG: CPT

## 2023-07-14 PROCEDURE — 99214 OFFICE O/P EST MOD 30 MIN: CPT

## 2023-07-14 RX ORDER — METFORMIN HYDROCHLORIDE 500 MG/1
1000 TABLET, EXTENDED RELEASE ORAL 2 TIMES DAILY
Qty: 180 TABLET | Refills: 4 | Status: SHIPPED | OUTPATIENT
Start: 2023-07-14 | End: 2023-09-28

## 2023-07-14 RX ORDER — PROCHLORPERAZINE 25 MG/1
SUPPOSITORY RECTAL
COMMUNITY
Start: 2023-06-30 | End: 2023-09-28

## 2023-07-14 ASSESSMENT — FIBROSIS 4 INDEX: FIB4 SCORE: 0.28

## 2023-07-14 NOTE — PROGRESS NOTES
CHIEF COMPLAINT: Patient is here for follow up of Type 1 Diabetes Mellitus    HPI:     Rita Norris is a 37 y.o. female with Type 1 Diabetes Mellitus here for follow up. Patient did not get blood work done.     Diabetes Type 1  Labs from 7/14/2023 HbA1c is 8.1  Glycohemoglobin A1c on 3/21/23 was 8.2    Current Medications  70/30 60 units in am and before dinner  Humalog 30 units at lunch 30-60 units at dinner  Ozempic 0.25mg every 7 days   Metformin  mg BID - self increased to 1000 mg BID - she has not been taking metformin for last couple weeks due to not being able to get refills    BG Diary:07/14/23      Weight has been stable    Diabetes Complications   Retinopathy: No known retinopathy.    Last eye exam: scheduled for next week.  Neuropathy: Denies paresthesias or numbness in hands or feet. Denies any foot wounds.  Exercise: Minimal.  Diet: Fair.    2. Dyslipidemia   Latest Reference Range & Units 04/10/23 14:42   Cholesterol,Tot 100 - 199 mg/dL 193   Triglycerides 0 - 149 mg/dL 243 (H)   HDL >=40 mg/dL 38 !   LDL <100 mg/dL 106 (H)     3. S/P Partial thyroidectomy  Partial right thyroidectomy last year in [place] related to thyroid mass which she was told was benign. Currently not on medication.   Patient states she feels like something is stuck in her throat - Better  Denies: SOB, anterior neck pain.   Reports: weight gain- better, mind fogginess- same, anxiety-same  Denies: fatigue, tremors, palpitations   Latest Reference Range & Units 04/10/23 14:42   TSH 0.380 - 5.330 uIU/mL 3.650   Free T-4 0.93 - 1.70 ng/dL 0.88 (L)      Latest Reference Range & Units 04/10/23 14:44   T3 60.0 - 181.0 ng/dL 109.0     4. Hypertension  Currently taking Valsartan 160 mg BID  She just started taking the losartarn today being the first dose. She states the blood pressure has been higher before.  BP: 146/96      Patient's medications, allergies, and social histories were reviewed and updated as appropriate.    ROS:  "    CONS:     No fever, no chills   EYES:     No diplopia, no blurry vision   CV:           No chest pain, no palpitations   PULM:     No SOB, no cough, no hemoptysis.   GI:            No nausea, no vomiting, no diarrhea, no constipation   ENDO:     No polyuria, no polydipsia, no heat intolerance, no cold intolerance       Past Medical History:  Problem List:  2023-03: Encounter to establish care with new doctor  2023-03: Other constipation  2023-03: S/P thyroidectomy  2021-04: Diabetes (HCC)  2021-04: Hypertensive disorder      Past Surgical History:  Past Surgical History:   Procedure Laterality Date    PRIMARY C SECTION          Allergies:  Penicillins     Social History:  Social History     Tobacco Use    Smoking status: Some Days     Packs/day: 0.50     Types: Cigarettes    Smokeless tobacco: Never   Substance Use Topics    Alcohol use: Yes     Alcohol/week: 0.6 oz     Types: 1 Shots of liquor per week    Drug use: Not Currently     Types: Marijuana        Family History:   family history includes Alcohol abuse in her father; Cancer in her mother; Diabetes in her paternal grandmother; Heart Disease in her paternal grandfather; Hypertension in her father; Psychiatric Illness in her mother.      PHYSICAL EXAM:   OBJECTIVE:  Vital signs: BP (!) 146/96 (BP Location: Right arm, Patient Position: Sitting, BP Cuff Size: Adult)   Pulse 96   Ht 1.575 m (5' 2\")   Wt 99.1 kg (218 lb 6.4 oz)   SpO2 97%   BMI 39.95 kg/m²   GENERAL: Well-developed, well-nourished in no apparent distress.   EYE:  No ocular asymmetry, PERRLA  HENT: Pink, moist mucous membranes.    NECK: No thyromegaly.   CARDIOVASCULAR:  No murmurs  LUNGS: Clear breath sounds  ABDOMEN: Soft, nontender   EXTREMITIES: No clubbing, cyanosis, or edema.   NEUROLOGICAL: No gross focal motor abnormalities   LYMPH: No cervical adenopathy palpated.   SKIN: No rashes, lesions.     Labs:  Lab Results   Component Value Date/Time    HBA1C 8.1 (A) 07/14/2023 09:14 AM "        Lab Results   Component Value Date/Time    WBC 11.0 (H) 04/10/2023 02:42 PM    RBC 5.63 (H) 04/10/2023 02:42 PM    HEMOGLOBIN 14.5 04/10/2023 02:42 PM    MCV 80.8 (L) 04/10/2023 02:42 PM    MCH 25.8 (L) 04/10/2023 02:42 PM    MCHC 31.9 (L) 04/10/2023 02:42 PM    RDW 44.1 04/10/2023 02:42 PM    MPV 10.7 04/10/2023 02:42 PM       Lab Results   Component Value Date/Time    SODIUM 139 04/10/2023 02:42 PM    POTASSIUM 4.4 04/10/2023 02:42 PM    CHLORIDE 104 04/10/2023 02:42 PM    CO2 21 04/10/2023 02:42 PM    ANION 14.0 04/10/2023 02:42 PM    GLUCOSE 108 (H) 04/10/2023 02:42 PM    BUN 12 04/10/2023 02:42 PM    CREATININE 0.57 04/10/2023 02:42 PM    CALCIUM 9.2 04/10/2023 02:42 PM    ASTSGOT 15 04/10/2023 02:42 PM    ALTSGPT 24 04/10/2023 02:42 PM    TBILIRUBIN 0.2 04/10/2023 02:42 PM    ALBUMIN 4.3 04/10/2023 02:42 PM    TOTPROTEIN 7.5 04/10/2023 02:42 PM    GLOBULIN 3.2 04/10/2023 02:42 PM    AGRATIO 1.3 04/10/2023 02:42 PM       Lab Results   Component Value Date/Time    CHOLSTRLTOT 193 04/10/2023 1442    TRIGLYCERIDE 243 (H) 04/10/2023 1442    HDL 38 (A) 04/10/2023 1442     (H) 04/10/2023 1442       No results found for: MICROALBCALC, MALBCRT, MALBEXCR, [id number], MICROALBUR, MICRALB, UMICROALBUM, MICROALBTIM     Lab Results   Component Value Date/Time    TSHULTRASEN 3.650 04/10/2023 1442     No results found for: FREEDIR  No results found for: FREET3  No results found for: THYSTIMIG        ASSESSMENT/PLAN:     1. Type 1 diabetes mellitus without complication (HCC)  Unstable  Medication:   70/30 60 units in am and before dinner - continue  Humalog 30 units at lunch 30-60 units at dinner - continue  Ozempic 0.25mg every 7 days - change to 0.5 mg every 7 days  Metformin  mg BID - self increased to 1000 mg BID - continue  Recommend diet low in fats and carbs  Recommend 30 mins of daily activity  Recommend checking feet daily  - POCT Hemoglobin A1C  - metFORMIN ER (GLUCOPHAGE XR) 500 MG TABLET SR 24 HR;  Take 2 Tablets by mouth 2 times a day.  Dispense: 180 Tablet; Refill: 4  - C-PEPTIDE; Future  - Comp Metabolic Panel; Future  - MELITON-65; Future  - MICROALBUMIN CREAT RATIO URINE; Future    2. S/P partial thyroidectomy  Stable  We will continue to monitor this  - TSH; Future  - FREE THYROXINE; Future    3. Hypertension, unspecified type  Unstable  Continue current regimen - see HPI  This is followed by pcp    4. Encounter for vitamin deficiency screening  Patients mind fogginess and anxiety may be due to low vitamin D levels. I will get vitamin d levels for further evaulation  - VITAMIN D,25 HYDROXY (DEFICIENCY); Future     Return in about 6 weeks (around 8/25/2023). Patient will have blood work done 2 weeks prior to next apt in 6 weeks.    Thank you kindly for allowing me to participate in the diabetes care plan for this patient.    Elio Fonseca, APRN   07/14/23    CC:   Bijan Chi M.D.

## 2023-07-18 ENCOUNTER — APPOINTMENT (OUTPATIENT)
Dept: MEDICAL GROUP | Facility: LAB | Age: 38
End: 2023-07-18
Payer: COMMERCIAL

## 2023-09-11 ENCOUNTER — OFFICE VISIT (OUTPATIENT)
Dept: MEDICAL GROUP | Facility: LAB | Age: 38
End: 2023-09-11
Payer: COMMERCIAL

## 2023-09-11 ENCOUNTER — HOSPITAL ENCOUNTER (OUTPATIENT)
Facility: MEDICAL CENTER | Age: 38
End: 2023-09-11
Attending: INTERNAL MEDICINE
Payer: COMMERCIAL

## 2023-09-11 ENCOUNTER — HOSPITAL ENCOUNTER (OUTPATIENT)
Dept: LAB | Facility: MEDICAL CENTER | Age: 38
End: 2023-09-11
Payer: COMMERCIAL

## 2023-09-11 VITALS
DIASTOLIC BLOOD PRESSURE: 90 MMHG | SYSTOLIC BLOOD PRESSURE: 142 MMHG | RESPIRATION RATE: 12 BRPM | HEIGHT: 63 IN | HEART RATE: 85 BPM | TEMPERATURE: 97.4 F | WEIGHT: 214 LBS | BODY MASS INDEX: 37.92 KG/M2 | OXYGEN SATURATION: 97 %

## 2023-09-11 DIAGNOSIS — E10.9 TYPE 1 DIABETES MELLITUS WITHOUT COMPLICATION (HCC): ICD-10-CM

## 2023-09-11 DIAGNOSIS — R35.0 URINARY FREQUENCY: ICD-10-CM

## 2023-09-11 DIAGNOSIS — Z23 NEED FOR PROPHYLACTIC VACCINATION AGAINST STREPTOCOCCUS PNEUMONIAE (PNEUMOCOCCUS): ICD-10-CM

## 2023-09-11 DIAGNOSIS — Z13.21 ENCOUNTER FOR VITAMIN DEFICIENCY SCREENING: ICD-10-CM

## 2023-09-11 DIAGNOSIS — E11.69 TYPE 2 DIABETES MELLITUS WITH OTHER SPECIFIED COMPLICATION, WITH LONG-TERM CURRENT USE OF INSULIN (HCC): ICD-10-CM

## 2023-09-11 DIAGNOSIS — E89.0 S/P PARTIAL THYROIDECTOMY: ICD-10-CM

## 2023-09-11 DIAGNOSIS — Z79.4 TYPE 2 DIABETES MELLITUS WITH OTHER SPECIFIED COMPLICATION, WITH LONG-TERM CURRENT USE OF INSULIN (HCC): ICD-10-CM

## 2023-09-11 DIAGNOSIS — B37.31 VAGINAL YEAST INFECTION: ICD-10-CM

## 2023-09-11 DIAGNOSIS — R53.83 OTHER FATIGUE: ICD-10-CM

## 2023-09-11 DIAGNOSIS — I15.9 SECONDARY HYPERTENSION: ICD-10-CM

## 2023-09-11 LAB
25(OH)D3 SERPL-MCNC: 24 NG/ML (ref 30–100)
ALBUMIN SERPL BCP-MCNC: 4.5 G/DL (ref 3.2–4.9)
ALBUMIN/GLOB SERPL: 1.4 G/DL
ALP SERPL-CCNC: 99 U/L (ref 30–99)
ALT SERPL-CCNC: 111 U/L (ref 2–50)
ANION GAP SERPL CALC-SCNC: 15 MMOL/L (ref 7–16)
APPEARANCE UR: CLEAR
AST SERPL-CCNC: 70 U/L (ref 12–45)
BILIRUB SERPL-MCNC: 0.3 MG/DL (ref 0.1–1.5)
BILIRUB UR STRIP-MCNC: ABNORMAL MG/DL
BUN SERPL-MCNC: 13 MG/DL (ref 8–22)
CALCIUM ALBUM COR SERPL-MCNC: 8.9 MG/DL (ref 8.5–10.5)
CALCIUM SERPL-MCNC: 9.3 MG/DL (ref 8.5–10.5)
CHLORIDE SERPL-SCNC: 98 MMOL/L (ref 96–112)
CO2 SERPL-SCNC: 23 MMOL/L (ref 20–33)
COLOR UR AUTO: YELLOW
CREAT SERPL-MCNC: 0.63 MG/DL (ref 0.5–1.4)
CREAT UR-MCNC: 80.02 MG/DL
GFR SERPLBLD CREATININE-BSD FMLA CKD-EPI: 118 ML/MIN/1.73 M 2
GLOBULIN SER CALC-MCNC: 3.3 G/DL (ref 1.9–3.5)
GLUCOSE SERPL-MCNC: 178 MG/DL (ref 65–99)
GLUCOSE UR STRIP.AUTO-MCNC: POSITIVE MG/DL
KETONES UR STRIP.AUTO-MCNC: ABNORMAL MG/DL
LEUKOCYTE ESTERASE UR QL STRIP.AUTO: ABNORMAL
MICROALBUMIN UR-MCNC: 29.4 MG/DL
MICROALBUMIN/CREAT UR: 367 MG/G (ref 0–30)
NITRITE UR QL STRIP.AUTO: ABNORMAL
PH UR STRIP.AUTO: 6 [PH] (ref 5–8)
POTASSIUM SERPL-SCNC: 3.9 MMOL/L (ref 3.6–5.5)
PROT SERPL-MCNC: 7.8 G/DL (ref 6–8.2)
PROT UR QL STRIP: POSITIVE MG/DL
RBC UR QL AUTO: ABNORMAL
SODIUM SERPL-SCNC: 136 MMOL/L (ref 135–145)
SP GR UR STRIP.AUTO: 1.03
T4 FREE SERPL-MCNC: 0.91 NG/DL (ref 0.93–1.7)
TSH SERPL DL<=0.005 MIU/L-ACNC: 5.2 UIU/ML (ref 0.38–5.33)
UROBILINOGEN UR STRIP-MCNC: 0.2 MG/DL

## 2023-09-11 PROCEDURE — 84439 ASSAY OF FREE THYROXINE: CPT

## 2023-09-11 PROCEDURE — 81001 URINALYSIS AUTO W/SCOPE: CPT

## 2023-09-11 PROCEDURE — 90677 PCV20 VACCINE IM: CPT | Performed by: INTERNAL MEDICINE

## 2023-09-11 PROCEDURE — 86341 ISLET CELL ANTIBODY: CPT

## 2023-09-11 PROCEDURE — 82306 VITAMIN D 25 HYDROXY: CPT

## 2023-09-11 PROCEDURE — 99214 OFFICE O/P EST MOD 30 MIN: CPT | Mod: 25 | Performed by: INTERNAL MEDICINE

## 2023-09-11 PROCEDURE — 3077F SYST BP >= 140 MM HG: CPT | Performed by: INTERNAL MEDICINE

## 2023-09-11 PROCEDURE — 80053 COMPREHEN METABOLIC PANEL: CPT

## 2023-09-11 PROCEDURE — 82570 ASSAY OF URINE CREATININE: CPT

## 2023-09-11 PROCEDURE — 90471 IMMUNIZATION ADMIN: CPT | Performed by: INTERNAL MEDICINE

## 2023-09-11 PROCEDURE — 3080F DIAST BP >= 90 MM HG: CPT | Performed by: INTERNAL MEDICINE

## 2023-09-11 PROCEDURE — 84443 ASSAY THYROID STIM HORMONE: CPT

## 2023-09-11 PROCEDURE — 81002 URINALYSIS NONAUTO W/O SCOPE: CPT | Performed by: INTERNAL MEDICINE

## 2023-09-11 PROCEDURE — 92250 FUNDUS PHOTOGRAPHY W/I&R: CPT | Mod: TC | Performed by: INTERNAL MEDICINE

## 2023-09-11 PROCEDURE — 84681 ASSAY OF C-PEPTIDE: CPT

## 2023-09-11 PROCEDURE — 36415 COLL VENOUS BLD VENIPUNCTURE: CPT

## 2023-09-11 PROCEDURE — 82043 UR ALBUMIN QUANTITATIVE: CPT

## 2023-09-11 RX ORDER — FLUCONAZOLE 150 MG/1
150 TABLET ORAL DAILY
Qty: 2 TABLET | Refills: 0 | Status: SHIPPED | OUTPATIENT
Start: 2023-09-11 | End: 2023-09-28

## 2023-09-11 RX ORDER — INSULIN HUMAN 100 [IU]/ML
60 INJECTION, SUSPENSION SUBCUTANEOUS
Qty: 5 EACH | Refills: 3 | Status: SHIPPED | OUTPATIENT
Start: 2023-09-11 | End: 2023-09-28

## 2023-09-11 RX ORDER — VALSARTAN 320 MG/1
320 TABLET ORAL DAILY
Qty: 30 TABLET | Refills: 3 | Status: SHIPPED | OUTPATIENT
Start: 2023-09-11 | End: 2024-01-11

## 2023-09-11 ASSESSMENT — FIBROSIS 4 INDEX: FIB4 SCORE: 0.28

## 2023-09-11 ASSESSMENT — PATIENT HEALTH QUESTIONNAIRE - PHQ9: CLINICAL INTERPRETATION OF PHQ2 SCORE: 0

## 2023-09-11 NOTE — PROGRESS NOTES
CC: Rita Norris is a 37 y.o. female is suffering from   Chief Complaint   Patient presents with    Urinary Frequency    Ear Fullness     Has had been sick recently    Vaginitis    Diabetes    Hypertension         SUBJECTIVE:  1. Urinary frequency  Rita is here for follow-up, is having problems with increased urinary frequency over the past week or so, is concerned about possible vaginal yeast infection    2. Need for prophylactic vaccination against Streptococcus pneumoniae (pneumococcus)  Patient is in need of strep pneumonia shot given    3. Type 2 diabetes mellitus with other specified complication, with long-term current use of insulin (HCC)  History of type 2 diabetes etiology, likely secondary to weight gain    4. Other fatigue  Patient with a history of fatigue, likely obstructive sleep apnea, nocturnal desaturation study ordered    5. Vaginal yeast infection  Start Diflucan    6. Secondary hypertension  Increase Diovan from 160 to 320 mg        Past social, family, history: , manager at [employer]  Social History     Tobacco Use    Smoking status: Some Days     Current packs/day: 0.50     Types: Cigarettes    Smokeless tobacco: Never   Substance Use Topics    Alcohol use: Yes     Alcohol/week: 0.6 oz     Types: 1 Shots of liquor per week    Drug use: Not Currently     Types: Marijuana         MEDICATIONS:    Current Outpatient Medications:     NON SPECIFIED, Nocturnal desaturation study., Disp: 1 Each, Rfl: 1    fluconazole (DIFLUCAN) 150 MG tablet, Take 1 Tablet by mouth every day., Disp: 2 Tablet, Rfl: 0    Insulin NPH Isophane & Regular (HUMULIN 70/30 KWIKPEN) (70-30) 100 UNIT/ML Suspension Pen-injector, Inject 60 Units under the skin 2 times daily, before breakfast and dinner., Disp: 5 Each, Rfl: 3    valsartan (DIOVAN) 320 MG tablet, Take 1 Tablet by mouth every day., Disp: 30 Tablet, Rfl: 3    Continuous Blood Gluc Sensor (DEXCOM G6 SENSOR) Misc, USE AS DIRECTED FOR 10 DAYS, Disp: , Rfl:  "    metFORMIN ER (GLUCOPHAGE XR) 500 MG TABLET SR 24 HR, Take 2 Tablets by mouth 2 times a day., Disp: 180 Tablet, Rfl: 4    docusate sodium (COLACE) 100 MG Cap, Take 1 Capsule by mouth 2 times a day., Disp: 60 Capsule, Rfl: 3    Semaglutide,0.25 or 0.5MG/DOS, (OZEMPIC, 0.25 OR 0.5 MG/DOSE,) 2 MG/3ML Solution Pen-injector, Inject 0.5 mg under the skin every 7 days., Disp: 9 mL, Rfl: 3    insulin lispro 100 UNIT/ML SC SOPN injection PEN, Inject 30-60 Units under the skin 3 times a day before meals., Disp: , Rfl:     Multiple Vitamins-Minerals (QC WOMENS DAILY MULTIVITAMIN PO), Take  by mouth., Disp: , Rfl:     PROBLEMS:  Patient Active Problem List    Diagnosis Date Noted    Encounter to establish care with new doctor 03/21/2023    Other constipation 03/21/2023    S/P thyroidectomy 03/21/2023    Diabetes (HCC) 04/26/2021    Hypertensive disorder 04/26/2021       REVIEW OF SYSTEMS:  Gen.:  No Nausea, Vomiting, fever, Chills.  Heart: No chest pain.  Lungs:  No shortness of Breath.  Psychological: Marquis unusual Anxiety depression     PHYSICAL EXAM   Constitutional: Alert, cooperative, not in acute distress.  Cardiovascular:  Rate Rhythm is regular without murmurs rubs clicks.     Thorax & Lungs: Clear to auscultation, no wheezing, rhonchi, or rales  HENT: Normocephalic, Atraumatic.  Eyes: PERRLA, EOMI, Conjunctiva normal.   Neck: Trachia is midline no swelling of the thyroid.   Lymphatic: No lymphadenopathy noted.   Neurologic: Alert & oriented x 3, cranial nerves II through XII are intact, Normal motor function, Normal sensory function, No focal deficits noted.   Psychiatric: Affect normal, Judgment normal, Mood normal.     VITAL SIGNS:BP (!) 142/90   Pulse 85   Temp 36.3 °C (97.4 °F) (Temporal)   Resp 12   Ht 1.6 m (5' 3\")   Wt 97.1 kg (214 lb)   SpO2 97%   BMI 37.91 kg/m²     Labs: Reviewed    Assessment:                                                     Plan:    1. Urinary frequency  Urinalysis in the " office is negative urine culture ordered  - POCT Retinal Eye Exam  - POCT Urinalysis  - URINALYSIS,CULTURE IF INDICATED; Future    2. Need for prophylactic vaccination against Streptococcus pneumoniae (pneumococcus)  Retinal scan completed pneumococcal vaccination given  - POCT Retinal Eye Exam  - Pneumococcal Conjugate Vaccine 20-Valent (19 yrs+)    3. Type 2 diabetes mellitus with other specified complication, with long-term current use of insulin (HCC)  Retinal scan completed  - POCT Retinal Eye Exam  - NON SPECIFIED; Nocturnal desaturation study.  Dispense: 1 Each; Refill: 1  - Insulin NPH Isophane & Regular (HUMULIN 70/30 KWIKPEN) (70-30) 100 UNIT/ML Suspension Pen-injector; Inject 60 Units under the skin 2 times daily, before breakfast and dinner.  Dispense: 5 Each; Refill: 3    4. Other fatigue  Nocturnal desaturation study ordered  - NON SPECIFIED; Nocturnal desaturation study.  Dispense: 1 Each; Refill: 1    5. Vaginal yeast infection  Diflucan  - fluconazole (DIFLUCAN) 150 MG tablet; Take 1 Tablet by mouth every day.  Dispense: 2 Tablet; Refill: 0    6. Secondary hypertension  Increase Diovan from 160 to 320 mg  - valsartan (DIOVAN) 320 MG tablet; Take 1 Tablet by mouth every day.  Dispense: 30 Tablet; Refill: 3

## 2023-09-11 NOTE — LETTER
September 11, 2023        Patient: Rita Norris   YOB: 1985   Date of Visit: 9/11/2023           To Whom It May Concern:    It is my medical opinion that Rita Norris is suffering from a medical condition and needs to be off work from September 4, 2023 through September 11, 2023, patient may return to work September 12, 2023.     If you have any questions or concerns, please don't hesitate to call.        Sincerely,          Subhash Espinoza D.O.  Board Certified General Internal Medicine.      [address] [id number] [phone] (Phone)  [phone] (Fax)

## 2023-09-12 LAB
AMORPH CRY #/AREA URNS HPF: PRESENT /HPF
APPEARANCE UR: ABNORMAL
BACTERIA #/AREA URNS HPF: NEGATIVE /HPF
BILIRUB UR QL STRIP.AUTO: NEGATIVE
COLOR UR: YELLOW
EPI CELLS #/AREA URNS HPF: NORMAL /HPF
GLUCOSE UR STRIP.AUTO-MCNC: 500 MG/DL
KETONES UR STRIP.AUTO-MCNC: ABNORMAL MG/DL
LEUKOCYTE ESTERASE UR QL STRIP.AUTO: NEGATIVE
MICRO URNS: ABNORMAL
NITRITE UR QL STRIP.AUTO: NEGATIVE
PH UR STRIP.AUTO: 6 [PH] (ref 5–8)
PROT UR QL STRIP: 30 MG/DL
RBC # URNS HPF: NORMAL /HPF
RBC UR QL AUTO: NEGATIVE
SP GR UR STRIP.AUTO: 1.02
UROBILINOGEN UR STRIP.AUTO-MCNC: 0.2 MG/DL
WBC #/AREA URNS HPF: NORMAL /HPF

## 2023-09-13 DIAGNOSIS — E11.3591 PROLIFERATIVE DIABETIC RETINOPATHY OF RIGHT EYE ASSOCIATED WITH TYPE 2 DIABETES MELLITUS, UNSPECIFIED PROLIFERATIVE RETINOPATHY TYPE (HCC): ICD-10-CM

## 2023-09-13 LAB
C PEPTIDE SERPL-MCNC: 2.6 NG/ML (ref 0.5–3.3)
RETINAL SCREEN: POSITIVE

## 2023-09-15 ENCOUNTER — OFFICE VISIT (OUTPATIENT)
Dept: MEDICAL GROUP | Facility: LAB | Age: 38
End: 2023-09-15
Payer: COMMERCIAL

## 2023-09-15 VITALS
DIASTOLIC BLOOD PRESSURE: 84 MMHG | BODY MASS INDEX: 38.45 KG/M2 | SYSTOLIC BLOOD PRESSURE: 138 MMHG | HEIGHT: 63 IN | RESPIRATION RATE: 16 BRPM | WEIGHT: 217 LBS

## 2023-09-15 DIAGNOSIS — R79.89 LOW VITAMIN D LEVEL: ICD-10-CM

## 2023-09-15 DIAGNOSIS — F41.9 ANXIETY: ICD-10-CM

## 2023-09-15 DIAGNOSIS — Z11.59 NEED FOR HEPATITIS B SCREENING TEST: ICD-10-CM

## 2023-09-15 DIAGNOSIS — Z79.4 TYPE 2 DIABETES MELLITUS WITH OTHER SPECIFIED COMPLICATION, WITH LONG-TERM CURRENT USE OF INSULIN (HCC): ICD-10-CM

## 2023-09-15 DIAGNOSIS — R09.81 NASAL CONGESTION: ICD-10-CM

## 2023-09-15 DIAGNOSIS — E03.8 OTHER SPECIFIED HYPOTHYROIDISM: ICD-10-CM

## 2023-09-15 DIAGNOSIS — G47.39 SLEEP APNEA-LIKE BEHAVIOR: ICD-10-CM

## 2023-09-15 DIAGNOSIS — Z11.59 NEED FOR HEPATITIS C SCREENING TEST: ICD-10-CM

## 2023-09-15 DIAGNOSIS — Z23 FLU VACCINE NEED: ICD-10-CM

## 2023-09-15 DIAGNOSIS — E11.69 TYPE 2 DIABETES MELLITUS WITH OTHER SPECIFIED COMPLICATION, WITH LONG-TERM CURRENT USE OF INSULIN (HCC): ICD-10-CM

## 2023-09-15 LAB — GAD65 AB SER IA-ACNC: <5 IU/ML (ref 0–5)

## 2023-09-15 PROCEDURE — 90471 IMMUNIZATION ADMIN: CPT | Performed by: FAMILY MEDICINE

## 2023-09-15 PROCEDURE — 3075F SYST BP GE 130 - 139MM HG: CPT | Performed by: FAMILY MEDICINE

## 2023-09-15 PROCEDURE — 99214 OFFICE O/P EST MOD 30 MIN: CPT | Mod: 25 | Performed by: FAMILY MEDICINE

## 2023-09-15 PROCEDURE — 90686 IIV4 VACC NO PRSV 0.5 ML IM: CPT | Performed by: FAMILY MEDICINE

## 2023-09-15 PROCEDURE — 3079F DIAST BP 80-89 MM HG: CPT | Performed by: FAMILY MEDICINE

## 2023-09-15 RX ORDER — FLUTICASONE PROPIONATE 50 MCG
1 SPRAY, SUSPENSION (ML) NASAL DAILY
Qty: 16 G | Refills: 1 | Status: SHIPPED | OUTPATIENT
Start: 2023-09-15 | End: 2023-11-21

## 2023-09-15 RX ORDER — ERGOCALCIFEROL 1.25 MG/1
50000 CAPSULE ORAL
Qty: 8 CAPSULE | Refills: 0 | Status: SHIPPED | OUTPATIENT
Start: 2023-09-15 | End: 2023-09-15 | Stop reason: SDUPTHER

## 2023-09-15 RX ORDER — ERGOCALCIFEROL 1.25 MG/1
50000 CAPSULE ORAL
Qty: 8 CAPSULE | Refills: 0 | Status: SHIPPED | OUTPATIENT
Start: 2023-09-15 | End: 2023-11-21

## 2023-09-15 RX ORDER — LEVOTHYROXINE SODIUM 0.05 MG/1
50 TABLET ORAL
Qty: 30 TABLET | Refills: 3 | Status: SHIPPED | OUTPATIENT
Start: 2023-09-15 | End: 2024-01-11

## 2023-09-15 RX ORDER — ERGOCALCIFEROL 1.25 MG/1
50000 CAPSULE ORAL DAILY
Qty: 8 CAPSULE | Refills: 0 | Status: SHIPPED | OUTPATIENT
Start: 2023-09-15 | End: 2023-09-15

## 2023-09-15 ASSESSMENT — FIBROSIS 4 INDEX: FIB4 SCORE: 0.6

## 2023-09-15 NOTE — PROGRESS NOTES
Chief Complaint:   Chief Complaint   Patient presents with    Lab Results       HPI: Established patient  Rita Norris is a 37 y.o. female who presents for follow-up, has multiple medical concerns addressed some of them today and patient will reschedule to come back for assessment and discussion of her other concerns.    1. Type 2 diabetes mellitus   Chronic, established with endocrinology, most recent A1c 8.1, continues to take insulin and Ozempic and metformin.    2. Other specified hypothyroidism  Status post partial thyroidectomy, patient thyroid function with low free thyroxine level, discussed to start medications.  Reports symptoms of fatigue tiredness and some emotional changes    3. Anxiety  Chronic ongoing, would like to start medication feeling anxious all the time.  Denies red flag symptoms or suicidal ideations    4. Low vitamin D level  Noted low levels of vitamin D reviewed her labs today    5. Nasal congestion  Patient would like to start Flonase, asking for refill of her medication.    6. Sleep apnea-like behavior  Would like to check for sleep apnea, reports sleep apnea-like behavior, snoring and fatigue and tiredness, BMI 38.4    7. Flu vaccine need  Okay for flu vaccine today denies acute illness or fever    Elevated liver enzymes, discussed with the patient screen for hepatitis, ultrasound reviewed evidence of fatty liver changes.        Past medical history, family history, social history and medications reviewed and updated in the record.  Today  Current medications, problem list and allergies reviewed in [facility] today  Health maintenance topics are reviewed and updated.    Patient Active Problem List    Diagnosis Date Noted    Other specified hypothyroidism 09/15/2023    Anxiety 09/15/2023    Low vitamin D level 09/15/2023    Encounter to establish care with new doctor 03/21/2023    Other constipation 03/21/2023    S/P thyroidectomy 03/21/2023    Diabetes (HCC) 04/26/2021    Hypertensive  disorder 04/26/2021     Family History   Problem Relation Age of Onset    Psychiatric Illness Mother         depression    Cancer Mother         thyroid ca    Hypertension Father     Alcohol abuse Father     Diabetes Paternal Grandmother     Heart Disease Paternal Grandfather      Social History     Socioeconomic History    Marital status: Single     Spouse name: Not on file    Number of children: Not on file    Years of education: Not on file    Highest education level: Not on file   Occupational History     Comment: manifacturing   Tobacco Use    Smoking status: Some Days     Current packs/day: 0.50     Types: Cigarettes    Smokeless tobacco: Never   Vaping Use    Vaping Use: Not on file   Substance and Sexual Activity    Alcohol use: Yes     Alcohol/week: 0.6 oz     Types: 1 Shots of liquor per week    Drug use: Not Currently     Types: Marijuana    Sexual activity: Yes     Partners: Male   Other Topics Concern    Not on file   Social History Narrative    Not on file     Social Determinants of Health     Financial Resource Strain: Not on file   Food Insecurity: Not on file   Transportation Needs: Not on file   Physical Activity: Not on file   Stress: Not on file   Social Connections: Not on file   Intimate Partner Violence: Not on file   Housing Stability: Not on file     Current Outpatient Medications   Medication Sig Dispense Refill    levothyroxine (SYNTHROID) 50 MCG Tab Take 1 Tablet by mouth every morning on an empty stomach. 30 Tablet 3    ergocalciferol (DRISDOL) 46880 UNIT capsule Take 1 Capsule by mouth every day. 8 Capsule 0    sertraline (ZOLOFT) 50 MG Tab Take 1 Tablet by mouth every day. 30 Tablet 11    fluticasone (FLONASE) 50 MCG/ACT nasal spray Administer 1 Spray into affected nostril(S) every day. 16 g 1    NON SPECIFIED Nocturnal desaturation study. 1 Each 1    fluconazole (DIFLUCAN) 150 MG tablet Take 1 Tablet by mouth every day. 2 Tablet 0    Insulin NPH Isophane & Regular (HUMULIN 70/30  "KWIKPEN) (70-30) 100 UNIT/ML Suspension Pen-injector Inject 60 Units under the skin 2 times daily, before breakfast and dinner. 5 Each 3    valsartan (DIOVAN) 320 MG tablet Take 1 Tablet by mouth every day. 30 Tablet 3    Continuous Blood Gluc Sensor (DEXCOM G6 SENSOR) Misc USE AS DIRECTED FOR 10 DAYS      metFORMIN ER (GLUCOPHAGE XR) 500 MG TABLET SR 24 HR Take 2 Tablets by mouth 2 times a day. 180 Tablet 4    docusate sodium (COLACE) 100 MG Cap Take 1 Capsule by mouth 2 times a day. 60 Capsule 3    Multiple Vitamins-Minerals (QC WOMENS DAILY MULTIVITAMIN PO) Take  by mouth.      Semaglutide,0.25 or 0.5MG/DOS, (OZEMPIC, 0.25 OR 0.5 MG/DOSE,) 2 MG/3ML Solution Pen-injector Inject 0.5 mg under the skin every 7 days. 9 mL 3    insulin lispro 100 UNIT/ML SC SOPN injection PEN Inject 30-60 Units under the skin 3 times a day before meals.       No current facility-administered medications for this visit.           Review Of Systems  As documented in HPI above  PHYSICAL EXAMINATION:    /84 (BP Location: Right arm, Patient Position: Sitting, BP Cuff Size: Adult)   Resp 16   Ht 1.6 m (5' 3\")   Wt 98.4 kg (217 lb)   BMI 38.44 kg/m²   Gen.: Well-developed, well-nourished, no apparent distress, pleasant and cooperative with the examination  HEENT: Normocephalic/atraumatic, sinuses nontender with palpation, TMs clear, nares patent with pink mucosa and clear rhinorrhea, oropharynx clear  Neck: No JVD or bruits, no adenopathy  Cor: Regular rate and rhythm without murmur gallop or rub  Lungs: Clear to auscultation with equal breath sounds bilaterally. No wheezes, rhonchi.  Abdomen: Soft nontender without hepatosplenomegaly or masses appreciated, normoactive bowel sounds  Extremities: No cyanosis, clubbing or edema       ASSESSMENT/Plan:  1. Type 2 diabetes mellitus with other specified complication, with long-term current use of insulin (HCC)  Chronic, still not well controlled A1c above 7.  Follow-up with endocrinology, " continue medications as directed along with healthy lifestyle changes, patient to schedule appointment for discussion of weight management      2. Other specified hypothyroidism  Chronic status post partial thyroidectomy we will start patient on Synthroid to help control her thyroid function, abnormal thyroid function test discussed with patient today patient is symptomatic for symptoms of hypothyroidism    levothyroxine (SYNTHROID) 50 MCG Tab    TSH    FREE THYROXINE      3. Anxiety  Chronic ongoing uncontrolled, no red flags.  Patient to start Zoloft after starting Synthroid 1 week apart.  And follow-up in 4 weeks    sertraline (ZOLOFT) 50 MG Tab      4. Low vitamin D level  ergocalciferol (DRISDOL) 12284 UNIT capsule      5. Nasal congestion  fluticasone (FLONASE) 50 MCG/ACT nasal spray      6. Sleep apnea-like behavior  Referral to Pulmonary and Sleep Medicine

## 2023-09-18 ENCOUNTER — TELEPHONE (OUTPATIENT)
Dept: MEDICAL GROUP | Facility: LAB | Age: 38
End: 2023-09-18
Payer: COMMERCIAL

## 2023-09-18 NOTE — TELEPHONE ENCOUNTER
[facility] is contacting us, the Humulin 70/30 Kwikpen is not covered.  Novolin Flexpen Relion is the preferred brand.  Would you like to change the med?

## 2023-09-19 RX ORDER — HUMAN INSULIN 100 [IU]/ML
50 INJECTION, SUSPENSION SUBCUTANEOUS 2 TIMES DAILY
Qty: 3 ML | Refills: 3 | Status: SHIPPED | OUTPATIENT
Start: 2023-09-19 | End: 2023-09-28

## 2023-09-21 DIAGNOSIS — E11.69 TYPE 2 DIABETES MELLITUS WITH OTHER SPECIFIED COMPLICATION, WITH LONG-TERM CURRENT USE OF INSULIN (HCC): ICD-10-CM

## 2023-09-21 DIAGNOSIS — Z79.4 TYPE 2 DIABETES MELLITUS WITH OTHER SPECIFIED COMPLICATION, WITH LONG-TERM CURRENT USE OF INSULIN (HCC): ICD-10-CM

## 2023-09-22 NOTE — TELEPHONE ENCOUNTER
I tried contacting pharmacy to ask what they have in stock.  No answer, so I faxed them asking what they had.

## 2023-09-28 ENCOUNTER — OFFICE VISIT (OUTPATIENT)
Dept: ENDOCRINOLOGY | Facility: MEDICAL CENTER | Age: 38
End: 2023-09-28
Payer: COMMERCIAL

## 2023-09-28 ENCOUNTER — TELEPHONE (OUTPATIENT)
Dept: HEALTH INFORMATION MANAGEMENT | Facility: OTHER | Age: 38
End: 2023-09-28

## 2023-09-28 ENCOUNTER — TELEPHONE (OUTPATIENT)
Dept: ENDOCRINOLOGY | Facility: MEDICAL CENTER | Age: 38
End: 2023-09-28

## 2023-09-28 VITALS
HEIGHT: 63 IN | OXYGEN SATURATION: 98 % | DIASTOLIC BLOOD PRESSURE: 120 MMHG | WEIGHT: 213 LBS | BODY MASS INDEX: 37.74 KG/M2 | SYSTOLIC BLOOD PRESSURE: 160 MMHG | HEART RATE: 88 BPM

## 2023-09-28 DIAGNOSIS — I10 HYPERTENSION, UNSPECIFIED TYPE: ICD-10-CM

## 2023-09-28 DIAGNOSIS — E89.0 S/P PARTIAL THYROIDECTOMY: ICD-10-CM

## 2023-09-28 DIAGNOSIS — E11.65 TYPE 2 DIABETES MELLITUS WITH HYPERGLYCEMIA, WITH LONG-TERM CURRENT USE OF INSULIN (HCC): ICD-10-CM

## 2023-09-28 DIAGNOSIS — E55.9 VITAMIN D DEFICIENCY: ICD-10-CM

## 2023-09-28 DIAGNOSIS — Z79.4 TYPE 2 DIABETES MELLITUS WITH HYPERGLYCEMIA, WITH LONG-TERM CURRENT USE OF INSULIN (HCC): ICD-10-CM

## 2023-09-28 PROCEDURE — 99214 OFFICE O/P EST MOD 30 MIN: CPT

## 2023-09-28 PROCEDURE — 99211 OFF/OP EST MAY X REQ PHY/QHP: CPT

## 2023-09-28 PROCEDURE — 3080F DIAST BP >= 90 MM HG: CPT

## 2023-09-28 PROCEDURE — 3077F SYST BP >= 140 MM HG: CPT

## 2023-09-28 RX ORDER — FLUCONAZOLE 150 MG/1
150 TABLET ORAL DAILY
Qty: 3 TABLET | Refills: 1 | Status: SHIPPED | OUTPATIENT
Start: 2023-09-28

## 2023-09-28 RX ORDER — EMPAGLIFLOZIN 10 MG/1
10 TABLET, FILM COATED ORAL DAILY
Qty: 90 TABLET | Refills: 3 | Status: SHIPPED | OUTPATIENT
Start: 2023-09-28

## 2023-09-28 RX ORDER — SEMAGLUTIDE 1.34 MG/ML
1 INJECTION, SOLUTION SUBCUTANEOUS
Qty: 9 ML | Refills: 3 | Status: SHIPPED | OUTPATIENT
Start: 2023-09-28

## 2023-09-28 RX ORDER — PEN NEEDLE, DIABETIC 32GX 5/32"
NEEDLE, DISPOSABLE MISCELLANEOUS
COMMUNITY
Start: 2023-09-22 | End: 2023-09-28

## 2023-09-28 RX ORDER — INSULIN DEGLUDEC 200 U/ML
50 INJECTION, SOLUTION SUBCUTANEOUS
Qty: 27 ML | Refills: 5 | COMMUNITY
Start: 2023-09-28

## 2023-09-28 RX ORDER — PROCHLORPERAZINE 25 MG/1
1 SUPPOSITORY RECTAL
Qty: 9 EACH | Refills: 3 | Status: SHIPPED | OUTPATIENT
Start: 2023-09-28 | End: 2023-12-28 | Stop reason: SDUPTHER

## 2023-09-28 RX ORDER — PEN NEEDLE, DIABETIC 32GX 5/32"
NEEDLE, DISPOSABLE MISCELLANEOUS
Qty: 400 EACH | Refills: 3 | Status: SHIPPED | OUTPATIENT
Start: 2023-09-28

## 2023-09-28 ASSESSMENT — FIBROSIS 4 INDEX: FIB4 SCORE: 0.6

## 2023-09-28 NOTE — TELEPHONE ENCOUNTER
Received Refill PA request via [facility]  for Ozempic 1 mg/dose 4 MG/3ML Solution Pen-inj     (Quantity:9 mls, Day Supply:84)-$74.98  (Quantity:3mls, Day Supply:28)-$24.98     Insurance: [facility]  Member ID:[id number]  BIN: [id number]  PCN: [id number]  Group: [id number]      Ran Test claim via [place]      Pharmacy on file   [facility]/pharmacy #[id number] -  [address] [address]   Phone:  [phone]   Fax:  [phone]

## 2023-09-28 NOTE — TELEPHONE ENCOUNTER
Received Refill PA request via [facility]  for Jardiance 10mg tablets.   (Quantity:90, Day Supply:90)-$90  (Quantity:30, Day Supply:30)-$30    Insurance: [employer]  Member ID:[id number]  BIN: [id number]  PCN: [id number]  Group: [id number]     Ran Test claim via [place]     Pharmacy on file   [facility]/pharmacy #[id number] -  [address] [address]   Phone:  [phone]   Fax:  [phone]

## 2023-09-28 NOTE — PROGRESS NOTES
CHIEF COMPLAINT: Patient is here for follow up of Type 2 Diabetes Mellitus    HPI:     Rita Norris is a 37 y.o. female with Type 2 Diabetes Mellitus here for follow up. Patient did not get blood work done.     Diabetes Type 2  Labs from 7/14/2023 HbA1c is 8.1  Glycohemoglobin A1c on 3/21/23 was 8.2   Latest Reference Range & Units 09/11/23 11:29   C-Peptide 0.5 - 3.3 ng/mL 2.6      Latest Reference Range & Units 09/11/23 11:29   MELITON Antibody 0.0 - 5.0 IU/mL <5.0     Current Medications  70/30 30 units in am and before dinner   Humalog 30 units at lunch 30-60 units at dinner - stop  Ozempic 0.5mg every 7 days   Metformin ER 1000 mg BID- stopped taking it due to cramps and diarrhea    BG Diary:      Weight has decreased by 4lbs.     Diabetes Complications   Retinopathy: No known retinopathy.    Last eye exam: scheduled for next week.  Neuropathy: Denies paresthesias or numbness in hands or feet. Denies any foot wounds.  Exercise: Minimal.  Diet: Fair.    2. Dyslipidemia   Latest Reference Range & Units 04/10/23 14:42   Cholesterol,Tot 100 - 199 mg/dL 193   Triglycerides 0 - 149 mg/dL 243 (H)   HDL >=40 mg/dL 38 !   LDL <100 mg/dL 106 (H)     3. Albuminuria   Latest Reference Range & Units 09/11/23 11:28   Micro Alb Creat Ratio 0 - 30 mg/g 367 (H)   Creatinine, Urine mg/dL 80.02   Microalbumin, Urine Random mg/dL 29.4      Latest Reference Range & Units 09/11/23 11:29   GFR (CKD-EPI) >60 mL/min/1.73 m 2 118     4. S/P Partial thyroidectomy  Partial right thyroidectomy last year in [place] related to thyroid mass which she was told was benign. Currently not on medication.   Patient states she feels like something is stuck in her throat - Better  Denies: SOB, anterior neck pain.   Reports: weight gain- better, mind fogginess- getting better, anxiety-same  Denies: fatigue, tremors, palpitations   Latest Reference Range & Units 09/11/23 11:29   TSH 0.380 - 5.330 uIU/mL 5.200   Free T-4 0.93 - 1.70 ng/dL 0.91 (L)  "    5. Hypertension  Currently taking Valsartan 320 mg daily  BP: 160/120  Followed by pcp    6. Vitamin D deficiency  Currently taking Ergocalciferol 1.25 mg weekly   Latest Reference Range & Units 09/11/23 11:29   25-Hydroxy   Vitamin D 25 30 - 100 ng/mL 24 (L)     Patient's medications, allergies, and social histories were reviewed and updated as appropriate.    ROS:     CONS:     No fever, no chills   EYES:     No diplopia, no blurry vision   CV:           No chest pain, no palpitations   PULM:     No SOB, no cough, no hemoptysis.   GI:            No nausea, no vomiting, no diarrhea, no constipation   ENDO:     No polyuria, no polydipsia, no heat intolerance, no cold intolerance       Past Medical History:  Problem List:  2023-09: Vitamin D deficiency  2023-09: Other specified hypothyroidism  2023-09: Anxiety  2023-09: Low vitamin D level  2023-03: Encounter to establish care with new doctor  2023-03: Other constipation  2023-03: S/P partial thyroidectomy  2021-04: Diabetes (HCC)  2021-04: Hypertensive disorder      Past Surgical History:  Past Surgical History:   Procedure Laterality Date    PRIMARY C SECTION          Allergies:  Penicillins     Social History:  Social History     Tobacco Use    Smoking status: Some Days     Current packs/day: 0.50     Types: Cigarettes    Smokeless tobacco: Never   Substance Use Topics    Alcohol use: Yes     Alcohol/week: 0.6 oz     Types: 1 Shots of liquor per week    Drug use: Not Currently     Types: Marijuana        Family History:   family history includes Alcohol abuse in her father; Cancer in her mother; Diabetes in her paternal grandmother; Heart Disease in her paternal grandfather; Hypertension in her father; Psychiatric Illness in her mother.      PHYSICAL EXAM:   OBJECTIVE:  Vital signs: BP (!) 160/120 (BP Location: Right arm, Patient Position: Sitting, BP Cuff Size: Adult long)   Pulse 88   Ht 1.6 m (5' 3\")   Wt 96.6 kg (213 lb)   LMP 09/27/2023   SpO2 98%   " BMI 37.73 kg/m²   GENERAL: Well-developed, well-nourished in no apparent distress.   EYE:  No ocular asymmetry, PERRLA  HENT: Pink, moist mucous membranes.    NECK: No thyromegaly.   CARDIOVASCULAR:  No murmurs  LUNGS: Clear breath sounds  ABDOMEN: Soft, nontender   EXTREMITIES: No clubbing, cyanosis, or edema.   NEUROLOGICAL: No gross focal motor abnormalities   LYMPH: No cervical adenopathy palpated.   SKIN: No rashes, lesions.     Labs:  Lab Results   Component Value Date/Time    HBA1C 8.1 (A) 07/14/2023 09:14 AM        Lab Results   Component Value Date/Time    WBC 11.0 (H) 04/10/2023 02:42 PM    RBC 5.63 (H) 04/10/2023 02:42 PM    HEMOGLOBIN 14.5 04/10/2023 02:42 PM    MCV 80.8 (L) 04/10/2023 02:42 PM    MCH 25.8 (L) 04/10/2023 02:42 PM    MCHC 31.9 (L) 04/10/2023 02:42 PM    RDW 44.1 04/10/2023 02:42 PM    MPV 10.7 04/10/2023 02:42 PM       Lab Results   Component Value Date/Time    SODIUM 136 09/11/2023 11:29 AM    POTASSIUM 3.9 09/11/2023 11:29 AM    CHLORIDE 98 09/11/2023 11:29 AM    CO2 23 09/11/2023 11:29 AM    ANION 15.0 09/11/2023 11:29 AM    GLUCOSE 178 (H) 09/11/2023 11:29 AM    BUN 13 09/11/2023 11:29 AM    CREATININE 0.63 09/11/2023 11:29 AM    CALCIUM 9.3 09/11/2023 11:29 AM    ASTSGOT 70 (H) 09/11/2023 11:29 AM    ALTSGPT 111 (H) 09/11/2023 11:29 AM    TBILIRUBIN 0.3 09/11/2023 11:29 AM    ALBUMIN 4.5 09/11/2023 11:29 AM    TOTPROTEIN 7.8 09/11/2023 11:29 AM    GLOBULIN 3.3 09/11/2023 11:29 AM    AGRATIO 1.4 09/11/2023 11:29 AM       Lab Results   Component Value Date/Time    CHOLSTRLTOT 193 04/10/2023 1442    TRIGLYCERIDE 243 (H) 04/10/2023 1442    HDL 38 (A) 04/10/2023 1442     (H) 04/10/2023 1442       Lab Results   Component Value Date/Time    MALBCRT 367 (H) 09/11/2023 11:28 AM    MICROALBUR 29.4 09/11/2023 11:28 AM        Lab Results   Component Value Date/Time    TSHULTRASEN 3.650 04/10/2023 1442     No results found for: FREEDIR  No results found for: FREET3  No results found for:  THYSTIMIG        ASSESSMENT/PLAN:   1. Type 2 diabetes mellitus with hyperglycemia, with long-term current use of insulin (HCC)  Unstable  Medication:  Jardiance 10 mg daily - start  Ozempic 0.5 mg weekly - change to 1 mg weekly  Metformin 1000 mg BID - stop  NPH 70/30 30 units in the am and bedtime - stop  Humalog 30 units with breakfast and 30-60 units with lunch/dinner - stop  Samples given to patient.   Side effects of Jardiance discussed with patient. Patient understands to keep hydrated to prevent UTI and yeast infections  Patient understands as the dose is changed for ozempic , she may feel the side effects again  Recommend diet low in fats and carbs  Recommend 30 mins of daily activity  Recommend checking feet daily  - Empagliflozin (JARDIANCE) 10 MG Tab tablet; Take 1 Tablet by mouth every day.  Dispense: 90 Tablet; Refill: 3  - Insulin Degludec (TRESIBA FLEXTOUCH) 200 UNIT/ML Solution Pen-injector; Inject 50 Units under the skin at bedtime. 3 pens per month  Dispense: 27 mL; Refill: 5  - Semaglutide, 1 MG/DOSE, (OZEMPIC, 1 MG/DOSE,) 4 MG/3ML Solution Pen-injector; Inject 1 mg under the skin every 7 days.  Dispense: 9 mL; Refill: 3  - Continuous Blood Gluc Sensor (DEXCOM G6 SENSOR) Misc; 1 Applicator every 10 days.  Dispense: 9 Each; Refill: 3  - BD PEN NEEDLE HASMUKH U/F; For use with 2-3 daily insulin and or GLP-1 injection.  Dispense: 400 Each; Refill: 3  - fluconazole (DIFLUCAN) 150 MG tablet; Take 1 Tablet by mouth every day.  Dispense: 3 Tablet; Refill: 1    2. S/P partial thyroidectomy  Stable  Medication:  Levothyroxine 50 mcg daily - continue  Patient understands to take the medication on empty stomach prior to breakfast and wait 30 mins to eat    3. Hypertension, unspecified type  Unstable  Continue current regimen- See HPI  Recommend patient follow up with pcp regarding medication regimen    4. Vitamin D deficiency  Unstable  Continue regimen - see HPI    Return in about 4 weeks (around 10/26/2023).      Thank you kindly for allowing me to participate in the diabetes care plan for this patient.    Elio Fonseca, APRN   9/28/23    CC:   Bijan Chi M.D.

## 2023-09-29 RX ORDER — PEN NEEDLE, DIABETIC 32 GX 1/4"
NEEDLE, DISPOSABLE MISCELLANEOUS
Qty: 300 EACH | Refills: 3 | Status: SHIPPED | OUTPATIENT
Start: 2023-09-29

## 2023-10-04 ENCOUNTER — TELEPHONE (OUTPATIENT)
Dept: ENDOCRINOLOGY | Facility: MEDICAL CENTER | Age: 38
End: 2023-10-04
Payer: COMMERCIAL

## 2023-10-04 NOTE — TELEPHONE ENCOUNTER
Attempted to contact patient at [phone] to discuss [facility] pharmacy and services/benefits offered. No answer, left voicemail.    Mikayla Miller  Rx Coordinator   [phone]

## 2023-10-25 PROBLEM — Z79.4 TYPE 2 DIABETES MELLITUS WITH HYPERGLYCEMIA, WITH LONG-TERM CURRENT USE OF INSULIN (HCC): Status: ACTIVE | Noted: 2021-04-26

## 2023-10-25 PROBLEM — E11.65 TYPE 2 DIABETES MELLITUS WITH HYPERGLYCEMIA, WITH LONG-TERM CURRENT USE OF INSULIN (HCC): Status: ACTIVE | Noted: 2021-04-26

## 2023-11-21 DIAGNOSIS — R79.89 LOW VITAMIN D LEVEL: ICD-10-CM

## 2023-11-21 DIAGNOSIS — R09.81 NASAL CONGESTION: ICD-10-CM

## 2023-11-21 RX ORDER — FLUTICASONE PROPIONATE 50 MCG
1 SPRAY, SUSPENSION (ML) NASAL DAILY
Qty: 16 ML | Refills: 1 | Status: SHIPPED | OUTPATIENT
Start: 2023-11-21

## 2023-11-21 RX ORDER — ERGOCALCIFEROL 1.25 MG/1
50000 CAPSULE ORAL
Qty: 8 CAPSULE | Refills: 0 | Status: SHIPPED | OUTPATIENT
Start: 2023-11-21 | End: 2023-12-12

## 2023-12-12 DIAGNOSIS — R79.89 LOW VITAMIN D LEVEL: ICD-10-CM

## 2023-12-12 RX ORDER — ERGOCALCIFEROL 1.25 MG/1
50000 CAPSULE ORAL
Qty: 8 CAPSULE | Refills: 0 | Status: SHIPPED | OUTPATIENT
Start: 2023-12-12 | End: 2024-03-05

## 2023-12-28 ENCOUNTER — TELEPHONE (OUTPATIENT)
Dept: ENDOCRINOLOGY | Facility: MEDICAL CENTER | Age: 38
End: 2023-12-28
Payer: COMMERCIAL

## 2023-12-28 DIAGNOSIS — E11.65 TYPE 2 DIABETES MELLITUS WITH HYPERGLYCEMIA, WITH LONG-TERM CURRENT USE OF INSULIN (HCC): ICD-10-CM

## 2023-12-28 DIAGNOSIS — Z79.4 TYPE 2 DIABETES MELLITUS WITH HYPERGLYCEMIA, WITH LONG-TERM CURRENT USE OF INSULIN (HCC): ICD-10-CM

## 2023-12-28 RX ORDER — PROCHLORPERAZINE 25 MG/1
1 SUPPOSITORY RECTAL
Qty: 2 EACH | Refills: 2 | Status: SHIPPED | OUTPATIENT
Start: 2023-12-28

## 2023-12-28 RX ORDER — PROCHLORPERAZINE 25 MG/1
1 SUPPOSITORY RECTAL
Qty: 9 EACH | Refills: 3 | Status: SHIPPED | OUTPATIENT
Start: 2023-12-28

## 2023-12-28 NOTE — TELEPHONE ENCOUNTER
Received incoming call from patients  (Dereck Gilliland) requesting a new 3-month supply prescription for the Dexcom G6 Transmitter to be sent to the preferred pharmacy on file:    [facility]/PHARMACY #[id number] - [place], [place] - [address]  RX Coordinator/Liaison

## 2024-01-10 DIAGNOSIS — I15.9 SECONDARY HYPERTENSION: ICD-10-CM

## 2024-01-10 DIAGNOSIS — E03.8 OTHER SPECIFIED HYPOTHYROIDISM: ICD-10-CM

## 2024-01-10 NOTE — TELEPHONE ENCOUNTER
Received request via: Pharmacy    Was the patient seen in the last year in this department? Yes  9/15/23  Does the patient have an active prescription (recently filled or refills available) for medication(s) requested? No    Does the patient have [facility]CI Plus and need 100 day supply (blood pressure, diabetes and cholesterol meds only)? Medication is not for cholesterol, blood pressure or diabetes

## 2024-01-11 RX ORDER — LEVOTHYROXINE SODIUM 0.05 MG/1
50 TABLET ORAL
Qty: 30 TABLET | Refills: 3 | Status: SHIPPED | OUTPATIENT
Start: 2024-01-11

## 2024-01-11 RX ORDER — VALSARTAN 320 MG/1
320 TABLET ORAL DAILY
Qty: 30 TABLET | Refills: 3 | Status: SHIPPED | OUTPATIENT
Start: 2024-01-11

## 2024-03-04 DIAGNOSIS — R79.89 LOW VITAMIN D LEVEL: ICD-10-CM

## 2024-03-05 RX ORDER — ERGOCALCIFEROL 1.25 MG/1
50000 CAPSULE ORAL
Qty: 8 CAPSULE | Refills: 0 | Status: SHIPPED | OUTPATIENT
Start: 2024-03-05

## 2024-03-05 NOTE — TELEPHONE ENCOUNTER
Received request via: Pharmacy    Was the patient seen in the last year in this department? Yes    Does the patient have an active prescription (recently filled or refills available) for medication(s) requested? No    [facility]    Does the patient have FCI Plus and need 100 day supply (blood pressure, diabetes and cholesterol meds only)? Patient does not have SCP

## 2024-04-16 DIAGNOSIS — E03.8 OTHER SPECIFIED HYPOTHYROIDISM: ICD-10-CM

## 2024-04-16 RX ORDER — LEVOTHYROXINE SODIUM 0.05 MG/1
50 TABLET ORAL
Qty: 90 TABLET | Refills: 2 | Status: SHIPPED | OUTPATIENT
Start: 2024-04-16

## 2024-04-16 NOTE — TELEPHONE ENCOUNTER
Received request via: Pharmacy    Was the patient seen in the last year in this department? Yes    Does the patient have an active prescription (recently filled or refills available) for medication(s) requested? No    [facility]    Does the patient have skilled nursing Plus and need 100 day supply (blood pressure, diabetes and cholesterol meds only)? Patient does not have SCP

## 2024-05-06 ENCOUNTER — OFFICE VISIT (OUTPATIENT)
Dept: MEDICAL GROUP | Facility: LAB | Age: 39
End: 2024-05-06
Payer: COMMERCIAL

## 2024-05-06 VITALS
OXYGEN SATURATION: 97 % | DIASTOLIC BLOOD PRESSURE: 72 MMHG | HEIGHT: 63 IN | SYSTOLIC BLOOD PRESSURE: 130 MMHG | WEIGHT: 200 LBS | HEART RATE: 72 BPM | RESPIRATION RATE: 16 BRPM | BODY MASS INDEX: 35.44 KG/M2 | TEMPERATURE: 98.2 F

## 2024-05-06 DIAGNOSIS — E03.8 OTHER SPECIFIED HYPOTHYROIDISM: ICD-10-CM

## 2024-05-06 DIAGNOSIS — Z11.4 SCREENING FOR HIV WITHOUT PRESENCE OF RISK FACTORS: ICD-10-CM

## 2024-05-06 DIAGNOSIS — E11.65 TYPE 2 DIABETES MELLITUS WITH HYPERGLYCEMIA, WITH LONG-TERM CURRENT USE OF INSULIN (HCC): ICD-10-CM

## 2024-05-06 DIAGNOSIS — F41.9 ANXIETY: ICD-10-CM

## 2024-05-06 DIAGNOSIS — Z79.4 TYPE 2 DIABETES MELLITUS WITH HYPERGLYCEMIA, WITH LONG-TERM CURRENT USE OF INSULIN (HCC): ICD-10-CM

## 2024-05-06 DIAGNOSIS — R79.89 LOW VITAMIN D LEVEL: ICD-10-CM

## 2024-05-06 PROCEDURE — 3078F DIAST BP <80 MM HG: CPT | Performed by: FAMILY MEDICINE

## 2024-05-06 PROCEDURE — 3075F SYST BP GE 130 - 139MM HG: CPT | Performed by: FAMILY MEDICINE

## 2024-05-06 PROCEDURE — 99214 OFFICE O/P EST MOD 30 MIN: CPT | Performed by: FAMILY MEDICINE

## 2024-05-06 RX ORDER — ATORVASTATIN CALCIUM 10 MG/1
1 TABLET, FILM COATED ORAL
COMMUNITY

## 2024-05-06 RX ORDER — BUSPIRONE HYDROCHLORIDE 10 MG/1
10 TABLET ORAL 2 TIMES DAILY
Qty: 60 TABLET | Refills: 3 | Status: SHIPPED | OUTPATIENT
Start: 2024-05-06

## 2024-05-06 RX ORDER — HYDROXYZINE PAMOATE 25 MG/1
25 CAPSULE ORAL 3 TIMES DAILY PRN
Qty: 30 CAPSULE | Refills: 1 | Status: SHIPPED | OUTPATIENT
Start: 2024-05-06

## 2024-05-06 RX ORDER — INSULIN DETEMIR 100 [IU]/ML
INJECTION, SOLUTION SUBCUTANEOUS
COMMUNITY

## 2024-05-06 RX ORDER — ERGOCALCIFEROL 1.25 MG/1
50000 CAPSULE ORAL
Qty: 8 CAPSULE | Refills: 1 | Status: SHIPPED | OUTPATIENT
Start: 2024-05-06

## 2024-05-06 RX ORDER — BUPROPION HYDROCHLORIDE 150 MG/1
1 TABLET ORAL
COMMUNITY
End: 2024-05-06

## 2024-05-06 ASSESSMENT — ANXIETY QUESTIONNAIRES
1. FEELING NERVOUS, ANXIOUS, OR ON EDGE: NEARLY EVERY DAY
2. NOT BEING ABLE TO STOP OR CONTROL WORRYING: NEARLY EVERY DAY
5. BEING SO RESTLESS THAT IT IS HARD TO SIT STILL: MORE THAN HALF THE DAYS
7. FEELING AFRAID AS IF SOMETHING AWFUL MIGHT HAPPEN: NEARLY EVERY DAY
3. WORRYING TOO MUCH ABOUT DIFFERENT THINGS: NEARLY EVERY DAY
4. TROUBLE RELAXING: NEARLY EVERY DAY
6. BECOMING EASILY ANNOYED OR IRRITABLE: NEARLY EVERY DAY
GAD7 TOTAL SCORE: 20

## 2024-05-06 ASSESSMENT — PATIENT HEALTH QUESTIONNAIRE - PHQ9
SUM OF ALL RESPONSES TO PHQ QUESTIONS 1-9: 25
CLINICAL INTERPRETATION OF PHQ2 SCORE: 6
5. POOR APPETITE OR OVEREATING: 3 - NEARLY EVERY DAY

## 2024-05-06 ASSESSMENT — FIBROSIS 4 INDEX: FIB4 SCORE: 0.61

## 2024-05-06 NOTE — PROGRESS NOTES
"Chief Complaint   Patient presents with    Anxiety       Verbal consent was acquired by the patient to use [employer] ambient listening note generation during this visit Yes      HPI: Established patient  History of Present Illness  The patient is a 58-year-old female who presents for evaluation of multiple medical concerns.      1. Low vitamin D level  Requesting prescription refill, patient on supplements of vitamin D.    2. Anxiety  The patient has been grappling with anxiety for the past 2 weeks, which has escalated due to her impending laid off. She describes her anxiety as constant edginess and excessive sleep. Despite a history of anxiety, she has been able to manage it well in the past with cheerleading. Currently, she is seeking employment as a  with [employer], which has negatively impacted her ability to maintain a routine and focus. She denies any suicidal or homicidal ideation. Her current medication regimen includes Paxil and sertraline 50 mg, which she has been taking since her high school years and has proven beneficial. She has previously taken Wellbutrin.     3. Other specified hypothyroidism  Due to recheck her thyroid function, continues to follow-up with endocrinology.    4. Type 2 diabetes mellitus with hyperglycemia, with long-term current use of insulin (HCC)  Patient continues to follow-up with endocrinology, taking medications as directed  The patient has been under the care of an endocrinologist for her blood sugar levels. Her last recorded A1c was below 7 approximately 3 months ago. She is scheduled for a follow-up appointment to refill her Ozempic prescription                    Exam:     /72 (BP Location: Right arm, Patient Position: Sitting, BP Cuff Size: Adult)   Pulse 72   Temp 36.8 °C (98.2 °F) (Temporal)   Resp 16   Ht 1.6 m (5' 3\")   Wt 90.7 kg (200 lb)   SpO2 97%   BMI 35.43 kg/m²   Gen.: Well-developed, well-nourished, no apparent distress, pleasant " and cooperative with the examination  HEENT: Normocephalic/atraumatic,     Cor: Regular rate and rhythm without murmur gallop or rub  Lungs: Clear to auscultation with equal breath sounds bilaterally. No wheezes, rhonchi.  Abdomen: Soft nontender without hepatosplenomegaly or masses appreciated, normoactive bowel sounds  Extremities: No cyanosis, clubbing or edema           Assessment & Plan    1. Low vitamin D level  Needs prescription to be refilled  - vitamin D2, Ergocalciferol, (DRISDOL) 1.25 MG (32364 UT) Cap capsule; Take 1 Capsule by mouth every 7 days.  Dispense: 8 Capsule; Refill: 1    2. Anxiety  New concern  Most likely related to recent situation change she lost her job, reactive situational depression and anxiety.  The patient will be initiated on a regimen of BuSpar 10 mg, to be taken twice daily in conjunction with her Zoloft regimen.     Additionally, hydroxyzine will be prescribed for use as needed. An urgent referral will be made to behavioral health to establish a relationship.     In the event of symptom exacerbation or escalation, the patient is advised to seek immediate medical attention at the Emergency Room.  - Referral to Behavioral Health  - busPIRone (BUSPAR) 10 MG Tab tablet; Take 1 Tablet by mouth 2 times a day.  Dispense: 60 Tablet; Refill: 3  - hydrOXYzine pamoate (VISTARIL) 25 MG Cap; Take 1 Capsule by mouth 3 times a day as needed for Anxiety.  Dispense: 30 Capsule; Refill: 1    3. Other specified hypothyroidism  Continue follow-up with endocrinology recheck thyroid function  - TSH+FREE T4    4. Type 2 diabetes mellitus with hyperglycemia, with long-term current use of insulin (HCC)  Continue follow-up with endocrinology, patient is due to recheck her A1c and lipid profile  - Lipid Profile; Future  - HEMOGLOBIN A1C; Future    5. Screening for HIV without presence of risk factors    - HIV AG/AB COMBO ASSAY SCREENING; Future       Health maintenance.  Laboratory tests will be ordered,  including hepatitis C and HIV screening, to be conducted prior to her well-woman examination.    Follow-up  The patient is scheduled for a follow-up visit in 3 to 4 weeks.

## 2024-05-20 DIAGNOSIS — I15.9 SECONDARY HYPERTENSION: ICD-10-CM

## 2024-05-20 RX ORDER — VALSARTAN 320 MG/1
320 TABLET ORAL DAILY
Qty: 30 TABLET | Refills: 3 | Status: SHIPPED | OUTPATIENT
Start: 2024-05-20

## 2024-05-20 NOTE — TELEPHONE ENCOUNTER
Received request via: Pharmacy    Was the patient seen in the last year in this department? Yes  LOV : 5/6/2024   Does the patient have an active prescription (recently filled or refills available) for medication(s) requested? No    [facility]    Does the patient have skilled nursing Plus and need 100 day supply (blood pressure, diabetes and cholesterol meds only)? Patient does not have SCP

## 2024-06-06 ENCOUNTER — DOCUMENTATION (OUTPATIENT)
Dept: HEALTH INFORMATION MANAGEMENT | Facility: OTHER | Age: 39
End: 2024-06-06
Payer: COMMERCIAL

## 2024-06-20 RX ORDER — DOCUSATE SODIUM 100 MG/1
100 CAPSULE, LIQUID FILLED ORAL 2 TIMES DAILY
Qty: 60 CAPSULE | Refills: 0 | Status: SHIPPED | OUTPATIENT
Start: 2024-06-20

## 2024-06-20 NOTE — TELEPHONE ENCOUNTER
Received request via: Pharmacy    Was the patient seen in the last year in this department? Yes    Does the patient have an active prescription (recently filled or refills available) for medication(s) requested? No    [facility]     Does the patient have [facility] Plus and need 100 day supply (blood pressure, diabetes and cholesterol meds only)? Patient does not have SCP

## 2024-07-29 ENCOUNTER — TELEPHONE (OUTPATIENT)
Dept: HEALTH INFORMATION MANAGEMENT | Facility: OTHER | Age: 39
End: 2024-07-29
Payer: COMMERCIAL

## 2024-08-29 ENCOUNTER — APPOINTMENT (OUTPATIENT)
Dept: URGENT CARE | Facility: CLINIC | Age: 39
End: 2024-08-29
Payer: COMMERCIAL

## 2024-08-29 ENCOUNTER — TELEPHONE (OUTPATIENT)
Dept: ENDOCRINOLOGY | Facility: MEDICAL CENTER | Age: 39
End: 2024-08-29

## 2024-08-29 NOTE — TELEPHONE ENCOUNTER
Pt called wanting to get back on [facility]'s schedule. Scheduled for next available. Could you order new labs she has not been seen in almost a year. Pt stated she is going to urgent care later since she has not been feeling well and is needing medications.

## 2024-09-03 ENCOUNTER — APPOINTMENT (OUTPATIENT)
Dept: MEDICAL GROUP | Facility: LAB | Age: 39
End: 2024-09-03
Payer: COMMERCIAL

## 2024-09-03 VITALS
WEIGHT: 195 LBS | HEIGHT: 62 IN | TEMPERATURE: 98 F | OXYGEN SATURATION: 96 % | RESPIRATION RATE: 14 BRPM | DIASTOLIC BLOOD PRESSURE: 94 MMHG | BODY MASS INDEX: 35.88 KG/M2 | HEART RATE: 80 BPM | SYSTOLIC BLOOD PRESSURE: 136 MMHG

## 2024-09-03 DIAGNOSIS — E89.0 S/P PARTIAL THYROIDECTOMY: ICD-10-CM

## 2024-09-03 DIAGNOSIS — R79.89 ELEVATED LFTS: ICD-10-CM

## 2024-09-03 DIAGNOSIS — E55.9 VITAMIN D DEFICIENCY: ICD-10-CM

## 2024-09-03 DIAGNOSIS — B37.31 VAGINAL YEAST INFECTION: ICD-10-CM

## 2024-09-03 DIAGNOSIS — R39.9 UTI SYMPTOMS: ICD-10-CM

## 2024-09-03 DIAGNOSIS — Z23 NEED FOR TDAP VACCINATION: ICD-10-CM

## 2024-09-03 DIAGNOSIS — Z13.29 SCREENING FOR THYROID DISORDER: ICD-10-CM

## 2024-09-03 DIAGNOSIS — Z79.4 TYPE 2 DIABETES MELLITUS WITH HYPERGLYCEMIA, WITH LONG-TERM CURRENT USE OF INSULIN (HCC): ICD-10-CM

## 2024-09-03 DIAGNOSIS — Z20.9 EXPOSURE TO POTENTIAL INFECTION: ICD-10-CM

## 2024-09-03 DIAGNOSIS — E11.65 TYPE 2 DIABETES MELLITUS WITH HYPERGLYCEMIA, WITH LONG-TERM CURRENT USE OF INSULIN (HCC): ICD-10-CM

## 2024-09-03 LAB
APPEARANCE UR: CLEAR
BILIRUB UR STRIP-MCNC: NEGATIVE MG/DL
COLOR UR AUTO: YELLOW
GLUCOSE UR STRIP.AUTO-MCNC: 500 MG/DL
HBA1C MFR BLD: 7.5 % (ref ?–5.8)
KETONES UR STRIP.AUTO-MCNC: ABNORMAL MG/DL
LEUKOCYTE ESTERASE UR QL STRIP.AUTO: NEGATIVE
NITRITE UR QL STRIP.AUTO: NEGATIVE
PH UR STRIP.AUTO: 6.5 [PH] (ref 5–8)
POCT INT CON NEG: NEGATIVE
POCT INT CON POS: POSITIVE
PROT UR QL STRIP: 30 MG/DL
RBC UR QL AUTO: NEGATIVE
SP GR UR STRIP.AUTO: 1020
UROBILINOGEN UR STRIP-MCNC: 0.2 MG/DL

## 2024-09-03 PROCEDURE — 81002 URINALYSIS NONAUTO W/O SCOPE: CPT | Performed by: INTERNAL MEDICINE

## 2024-09-03 PROCEDURE — 3075F SYST BP GE 130 - 139MM HG: CPT | Performed by: INTERNAL MEDICINE

## 2024-09-03 PROCEDURE — 90471 IMMUNIZATION ADMIN: CPT | Performed by: INTERNAL MEDICINE

## 2024-09-03 PROCEDURE — 90715 TDAP VACCINE 7 YRS/> IM: CPT | Performed by: INTERNAL MEDICINE

## 2024-09-03 PROCEDURE — 3080F DIAST BP >= 90 MM HG: CPT | Performed by: INTERNAL MEDICINE

## 2024-09-03 PROCEDURE — 99214 OFFICE O/P EST MOD 30 MIN: CPT | Mod: 25 | Performed by: INTERNAL MEDICINE

## 2024-09-03 PROCEDURE — 83036 HEMOGLOBIN GLYCOSYLATED A1C: CPT | Performed by: INTERNAL MEDICINE

## 2024-09-03 RX ORDER — FLUCONAZOLE 150 MG/1
150 TABLET ORAL DAILY
Qty: 2 TABLET | Refills: 2 | Status: SHIPPED | OUTPATIENT
Start: 2024-09-03

## 2024-09-03 RX ORDER — INSULIN DEGLUDEC 200 U/ML
50 INJECTION, SOLUTION SUBCUTANEOUS
Qty: 27 ML | Refills: 5 | Status: SHIPPED | OUTPATIENT
Start: 2024-09-03

## 2024-09-03 ASSESSMENT — FIBROSIS 4 INDEX: FIB4 SCORE: 0.61

## 2024-09-03 NOTE — PROGRESS NOTES
CC: Rita Norris is a 38 y.o. female is suffering from   Chief Complaint   Patient presents with    Follow-Up    Lab Results         SUBJECTIVE:  1. Type 2 diabetes mellitus with hyperglycemia, with long-term current use of insulin (HCC)  Patient is here for follow-up has a history of type 2 diabetes currently well-controlled, needs refills of medication    2. UTI symptoms  Patient with UTI symptoms without any evidence of nitrate leukocyte esterase or blood in the urine    3. Vaginal yeast infection  Small vaginal yeast infection query possible problems with medication    4. Need for Tdap vaccination  Vaccination given    5. Exposure to potential infection  Check HIV    6. Screening for thyroid disorder  Check free T4 TSH    7. Elevated LFTs  Concerning for possible autoimmune hepatitis    History of Present Illness      Past social, family, history:   Social History     Tobacco Use    Smoking status: Some Days     Current packs/day: 0.50     Types: Cigarettes    Smokeless tobacco: Never   Substance Use Topics    Alcohol use: Yes     Alcohol/week: 0.6 oz     Types: 1 Shots of liquor per week    Drug use: Not Currently     Types: Marijuana         MEDICATIONS:    Current Outpatient Medications:     Insulin Degludec (TRESIBA FLEXTOUCH) 200 UNIT/ML Solution Pen-injector, Inject 50 Units under the skin at bedtime. 3 pens per month, Disp: 27 mL, Rfl: 5    fluconazole (DIFLUCAN) 150 MG tablet, Take 1 Tablet by mouth every day., Disp: 2 Tablet, Rfl: 2    Insulin Pen Needle (BD PEN NEEDLE MICRO U/F) 32G X 6 MM Misc, Please specify directions, refills and quantity, Disp: 300 Each, Rfl: 3    docusate sodium (COLACE) 100 MG Cap, TAKE 1 CAPSULE BY MOUTH TWICE A DAY, Disp: 60 Capsule, Rfl: 0    valsartan (DIOVAN) 320 MG tablet, TAKE 1 TABLET BY MOUTH EVERY DAY, Disp: 30 Tablet, Rfl: 3    atorvastatin (LIPITOR) 10 MG Tab, Take 1 Tablet by mouth every day., Disp: , Rfl:     vitamin D2, Ergocalciferol, (DRISDOL)  1.25 MG (35962 UT) Cap capsule, Take 1 Capsule by mouth every 7 days., Disp: 8 Capsule, Rfl: 1    busPIRone (BUSPAR) 10 MG Tab tablet, Take 1 Tablet by mouth 2 times a day., Disp: 60 Tablet, Rfl: 3    hydrOXYzine pamoate (VISTARIL) 25 MG Cap, Take 1 Capsule by mouth 3 times a day as needed for Anxiety., Disp: 30 Capsule, Rfl: 1    levothyroxine (SYNTHROID) 50 MCG Tab, TAKE 1 TABLET BY MOUTH EVERY DAY IN THE MORNING ON AN EMPTY STOMACH, Disp: 90 Tablet, Rfl: 2    Continuous Blood Gluc Sensor (DEXCOM G6 SENSOR) Misc, 1 Applicator every 10 days., Disp: 9 Each, Rfl: 3    Continuous Blood Gluc Transmit (DEXCOM G6 TRANSMITTER) Misc, 1 Applicator every 3 months., Disp: 2 Each, Rfl: 2    fluticasone (FLONASE) 50 MCG/ACT nasal spray, ADMINISTER 1 SPRAY INTO AFFECTED NOSTRIL(S) EVERY DAY., Disp: 16 mL, Rfl: 1    Empagliflozin (JARDIANCE) 10 MG Tab tablet, Take 1 Tablet by mouth every day., Disp: 90 Tablet, Rfl: 3    Semaglutide, 1 MG/DOSE, (OZEMPIC, 1 MG/DOSE,) 4 MG/3ML Solution Pen-injector, Inject 1 mg under the skin every 7 days., Disp: 9 mL, Rfl: 3    BD PEN NEEDLE HASMUKH U/F, For use with 2-3 daily insulin and or GLP-1 injection., Disp: 400 Each, Rfl: 3    sertraline (ZOLOFT) 50 MG Tab, Take 1 Tablet by mouth every day., Disp: 30 Tablet, Rfl: 11    NON SPECIFIED, Nocturnal desaturation study., Disp: 1 Each, Rfl: 1    Multiple Vitamins-Minerals (QC WOMENS DAILY MULTIVITAMIN PO), Take  by mouth., Disp: , Rfl:     insulin lispro 100 UNIT/ML SC SOPN injection PEN, Inject 30-60 Units under the skin 3 times a day before meals., Disp: , Rfl:     PROBLEMS:  Patient Active Problem List    Diagnosis Date Noted    Vitamin D deficiency 09/28/2023    Other specified hypothyroidism 09/15/2023    Anxiety 09/15/2023    Low vitamin D level 09/15/2023    Encounter to establish care with new doctor 03/21/2023    Other constipation 03/21/2023    S/P partial thyroidectomy 03/21/2023    Type 2 diabetes mellitus with hyperglycemia, with  "long-term current use of insulin ([facility]) 04/26/2021    Hypertension 04/26/2021       REVIEW OF SYSTEMS:  Gen.:  No Nausea, Vomiting, fever, Chills.  Heart: No chest pain.  Lungs:  No shortness of Breath.  Psychological: Marquis unusual Anxiety depression     PHYSICAL EXAM   Physical Exam       Constitutional: Alert, cooperative, not in acute distress.  Cardiovascular:  Rate Rhythm is regular without murmurs rubs clicks.     Thorax & Lungs: Clear to auscultation, no wheezing, rhonchi, or rales  HENT: Normocephalic, Atraumatic.  Eyes: PERRLA, EOMI, Conjunctiva normal.   Neck: Trachia is midline no swelling of the thyroid.   Lymphatic: No lymphadenopathy noted.   Neurologic: Alert & oriented x 3, cranial nerves II through XII are intact, Normal motor function, Normal sensory function, No focal deficits noted.   Psychiatric: Affect normal, Judgment normal, Mood normal.     VITAL SIGNS:BP (!) 136/94 (BP Location: Left arm, Patient Position: Sitting, BP Cuff Size: Adult)   Pulse 80   Temp 36.7 °C (98 °F)   Resp 14   Ht 1.575 m (5' 2\")   Wt 88.5 kg (195 lb)   SpO2 96%   BMI 35.67 kg/m²     Labs: Reviewed    Assessment:                                                     Plan:  Assessment & Plan       1. Type 2 diabetes mellitus with hyperglycemia, with long-term current use of insulin ([facility])  Hemoglobin A1c is acceptable  - POCT  A1C  - Insulin Degludec (TRESIBA FLEXTOUCH) 200 UNIT/ML Solution Pen-injector; Inject 50 Units under the skin at bedtime. 3 pens per month  Dispense: 27 mL; Refill: 5  - HEPATITIS PANEL ACUTE(4 COMPONENTS); Future    2. UTI symptoms  Currently no evidence of UTI  - POCT Urinalysis  - HEPATITIS PANEL ACUTE(4 COMPONENTS); Future    3. Vaginal yeast infection  Start Diflucan  - fluconazole (DIFLUCAN) 150 MG tablet; Take 1 Tablet by mouth every day.  Dispense: 2 Tablet; Refill: 2  - HEPATITIS PANEL ACUTE(4 COMPONENTS); Future    4. Need for Tdap vaccination  Vaccination given  - HEPATITIS PANEL " ACUTE(4 COMPONENTS); Future  - Tdap =>8yo IM    5. Exposure to potential infection  Check HIV  - HIV AG/AB COMBO ASSAY SCREENING; Future    6. Screening for thyroid disorder  Screening for thyroid  - TSH+FREE T4    7. Elevated LFTs  Check for autoimmune also acute hepatitis  - SRINATH REFLEXIVE PROFILE; Future  - ANTI-SMOOTH MUSCLE ABS; Future  - SOLUBLE LIVER ANTIGEN,IGG; Future  - LIVER-KIDNEY MICROSOMAL AB; Future

## 2024-09-04 ENCOUNTER — HOSPITAL ENCOUNTER (OUTPATIENT)
Dept: LAB | Facility: MEDICAL CENTER | Age: 39
End: 2024-09-04
Attending: INTERNAL MEDICINE
Payer: COMMERCIAL

## 2024-09-04 DIAGNOSIS — Z20.9 EXPOSURE TO POTENTIAL INFECTION: ICD-10-CM

## 2024-09-04 DIAGNOSIS — E11.65 TYPE 2 DIABETES MELLITUS WITH HYPERGLYCEMIA, WITH LONG-TERM CURRENT USE OF INSULIN (HCC): ICD-10-CM

## 2024-09-04 DIAGNOSIS — Z79.4 TYPE 2 DIABETES MELLITUS WITH HYPERGLYCEMIA, WITH LONG-TERM CURRENT USE OF INSULIN (HCC): ICD-10-CM

## 2024-09-04 DIAGNOSIS — R39.9 UTI SYMPTOMS: ICD-10-CM

## 2024-09-04 DIAGNOSIS — Z23 NEED FOR TDAP VACCINATION: ICD-10-CM

## 2024-09-04 DIAGNOSIS — R79.89 ELEVATED LFTS: ICD-10-CM

## 2024-09-04 DIAGNOSIS — B37.31 VAGINAL YEAST INFECTION: ICD-10-CM

## 2024-09-04 PROCEDURE — 83516 IMMUNOASSAY NONANTIBODY: CPT

## 2024-09-04 PROCEDURE — 84443 ASSAY THYROID STIM HORMONE: CPT

## 2024-09-04 PROCEDURE — 80074 ACUTE HEPATITIS PANEL: CPT

## 2024-09-04 PROCEDURE — 86038 ANTINUCLEAR ANTIBODIES: CPT

## 2024-09-04 PROCEDURE — 36415 COLL VENOUS BLD VENIPUNCTURE: CPT

## 2024-09-04 PROCEDURE — 84439 ASSAY OF FREE THYROXINE: CPT

## 2024-09-04 PROCEDURE — 86015 ACTIN ANTIBODY EACH: CPT

## 2024-09-04 PROCEDURE — 86376 MICROSOMAL ANTIBODY EACH: CPT

## 2024-09-04 PROCEDURE — 87389 HIV-1 AG W/HIV-1&-2 AB AG IA: CPT

## 2024-09-05 LAB
HAV IGM SERPL QL IA: NORMAL
HBV CORE IGM SER QL: NORMAL
HBV SURFACE AG SER QL: NORMAL
HCV AB SER QL: NORMAL
HIV 1+2 AB+HIV1 P24 AG SERPL QL IA: NORMAL
T4 FREE SERPL-MCNC: 1.18 NG/DL (ref 0.93–1.7)
TSH SERPL-ACNC: 6.41 UIU/ML (ref 0.35–5.5)

## 2024-09-06 LAB
LKM AB TITR SER IF: NORMAL {TITER}
NUCLEAR IGG SER QL IA: NORMAL
SMA IGG SER-ACNC: 4 UNITS (ref 0–19)

## 2024-09-08 LAB — SOLUBLE LIVER IGG SER IA-ACNC: 1.7 U (ref 0–24.9)

## 2024-09-10 ENCOUNTER — TELEPHONE (OUTPATIENT)
Dept: ENDOCRINOLOGY | Facility: MEDICAL CENTER | Age: 39
End: 2024-09-10
Payer: COMMERCIAL

## 2024-09-10 DIAGNOSIS — E03.8 OTHER SPECIFIED HYPOTHYROIDISM: ICD-10-CM

## 2024-09-10 RX ORDER — LEVOTHYROXINE SODIUM 75 UG/1
75 TABLET ORAL
Qty: 90 TABLET | Refills: 3 | Status: SHIPPED | OUTPATIENT
Start: 2024-09-10

## 2024-09-16 DIAGNOSIS — E11.65 TYPE 2 DIABETES MELLITUS WITH HYPERGLYCEMIA, WITH LONG-TERM CURRENT USE OF INSULIN (HCC): ICD-10-CM

## 2024-09-16 DIAGNOSIS — Z79.4 TYPE 2 DIABETES MELLITUS WITH HYPERGLYCEMIA, WITH LONG-TERM CURRENT USE OF INSULIN (HCC): ICD-10-CM

## 2024-09-17 ENCOUNTER — APPOINTMENT (OUTPATIENT)
Dept: ENDOCRINOLOGY | Facility: MEDICAL CENTER | Age: 39
End: 2024-09-17
Payer: COMMERCIAL

## 2024-09-17 RX ORDER — SEMAGLUTIDE 1.34 MG/ML
1 INJECTION, SOLUTION SUBCUTANEOUS
Qty: 9 ML | Refills: 3 | Status: SHIPPED | OUTPATIENT
Start: 2024-09-17

## 2024-09-24 ENCOUNTER — TELEPHONE (OUTPATIENT)
Dept: ENDOCRINOLOGY | Facility: MEDICAL CENTER | Age: 39
End: 2024-09-24
Payer: COMMERCIAL

## 2024-10-03 ENCOUNTER — TELEPHONE (OUTPATIENT)
Dept: ENDOCRINOLOGY | Facility: MEDICAL CENTER | Age: 39
End: 2024-10-03
Payer: COMMERCIAL

## 2024-10-03 ENCOUNTER — APPOINTMENT (OUTPATIENT)
Dept: ENDOCRINOLOGY | Facility: MEDICAL CENTER | Age: 39
End: 2024-10-03
Payer: COMMERCIAL

## 2024-11-12 DIAGNOSIS — E11.65 TYPE 2 DIABETES MELLITUS WITH HYPERGLYCEMIA, WITH LONG-TERM CURRENT USE OF INSULIN (HCC): ICD-10-CM

## 2024-11-12 DIAGNOSIS — Z79.4 TYPE 2 DIABETES MELLITUS WITH HYPERGLYCEMIA, WITH LONG-TERM CURRENT USE OF INSULIN (HCC): ICD-10-CM

## 2024-11-12 RX ORDER — EMPAGLIFLOZIN 10 MG/1
10 TABLET, FILM COATED ORAL DAILY
Qty: 90 TABLET | Refills: 3 | Status: SHIPPED | OUTPATIENT
Start: 2024-11-12

## 2024-11-12 RX ORDER — LOSARTAN POTASSIUM 50 MG/1
50 TABLET ORAL DAILY
Qty: 90 TABLET | Refills: 3 | Status: SHIPPED | OUTPATIENT
Start: 2024-11-12

## 2024-11-12 RX ORDER — LOSARTAN POTASSIUM 50 MG/1
50 TABLET ORAL DAILY
COMMUNITY
End: 2024-11-12 | Stop reason: SDUPTHER

## 2024-11-12 NOTE — TELEPHONE ENCOUNTER
Received request via: Pharmacy    Was the patient seen in the last year in this department? Yes    Does the patient have an active prescription (recently filled or refills available) for medication(s) requested? No    Pharmacy Name:   [facility]/pharmacy #[id number] - [place], [place] - [address]  [address]  [place] [address]  Phone: [phone] Fax: [phone]       Does the patient have jail Plus and need 100-day supply? (This applies to ALL medications) Patient does not have SCP

## 2024-12-02 DIAGNOSIS — I15.9 SECONDARY HYPERTENSION: ICD-10-CM

## 2024-12-02 RX ORDER — VALSARTAN 320 MG/1
320 TABLET ORAL DAILY
Qty: 30 TABLET | Refills: 3 | Status: SHIPPED | OUTPATIENT
Start: 2024-12-02

## 2025-02-12 DIAGNOSIS — B37.31 VAGINAL YEAST INFECTION: ICD-10-CM

## 2025-02-12 RX ORDER — FLUCONAZOLE 150 MG/1
150 TABLET ORAL DAILY
Qty: 2 TABLET | Refills: 1 | Status: SHIPPED | OUTPATIENT
Start: 2025-02-12

## 2025-02-20 DIAGNOSIS — Z79.4 TYPE 2 DIABETES MELLITUS WITH HYPERGLYCEMIA, WITH LONG-TERM CURRENT USE OF INSULIN (HCC): ICD-10-CM

## 2025-02-20 DIAGNOSIS — E11.65 TYPE 2 DIABETES MELLITUS WITH HYPERGLYCEMIA, WITH LONG-TERM CURRENT USE OF INSULIN (HCC): ICD-10-CM

## 2025-02-20 RX ORDER — PROCHLORPERAZINE 25 MG/1
SUPPOSITORY RECTAL
Qty: 9 EACH | Refills: 1 | Status: SHIPPED | OUTPATIENT
Start: 2025-02-20

## 2025-02-27 DIAGNOSIS — I15.9 SECONDARY HYPERTENSION: ICD-10-CM

## 2025-03-01 RX ORDER — VALSARTAN 320 MG/1
320 TABLET ORAL DAILY
Qty: 90 TABLET | Refills: 3 | Status: SHIPPED | OUTPATIENT
Start: 2025-03-01

## 2025-03-19 ENCOUNTER — TELEPHONE (OUTPATIENT)
Dept: HEALTH INFORMATION MANAGEMENT | Facility: OTHER | Age: 40
End: 2025-03-19
Payer: COMMERCIAL

## 2025-08-07 DIAGNOSIS — E03.8 OTHER SPECIFIED HYPOTHYROIDISM: ICD-10-CM

## 2025-08-07 RX ORDER — LEVOTHYROXINE SODIUM 75 UG/1
75 TABLET ORAL
Qty: 90 TABLET | Refills: 2 | Status: SHIPPED | OUTPATIENT
Start: 2025-08-07